# Patient Record
Sex: MALE | Race: OTHER | HISPANIC OR LATINO | URBAN - METROPOLITAN AREA
[De-identification: names, ages, dates, MRNs, and addresses within clinical notes are randomized per-mention and may not be internally consistent; named-entity substitution may affect disease eponyms.]

---

## 2022-04-18 ENCOUNTER — EMERGENCY (EMERGENCY)
Facility: HOSPITAL | Age: 27
LOS: 1 days | Discharge: HOME | End: 2022-04-18
Admitting: EMERGENCY MEDICINE
Payer: OTHER MISCELLANEOUS
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
## 2022-04-18 DIAGNOSIS — S61.213A LACERATION WITHOUT FOREIGN BODY OF LEFT MIDDLE FINGER WITHOUT DAMAGE TO NAIL, INITIAL ENCOUNTER: ICD-10-CM
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
## 2022-04-18 DIAGNOSIS — M79.642 PAIN IN LEFT HAND: ICD-10-CM

## 2022-04-18 PROCEDURE — 99283 EMERGENCY DEPT VISIT LOW MDM: CPT

## 2022-04-18 PROCEDURE — 73130 X-RAY EXAM OF HAND: CPT | Mod: 26,LT

## 2022-07-19 ENCOUNTER — INPATIENT (INPATIENT)
Facility: HOSPITAL | Age: 27
LOS: 8 days | Discharge: HOME | DRG: 988 | End: 2022-07-28
Attending: HOSPITALIST | Admitting: HOSPITALIST
Payer: COMMERCIAL

## 2022-07-19 ENCOUNTER — EMERGENCY (EMERGENCY)
Facility: HOSPITAL | Age: 27
LOS: 1 days | Discharge: HOME | End: 2022-07-19
Admitting: EMERGENCY MEDICINE

## 2022-07-19 ENCOUNTER — TRANSCRIPTION ENCOUNTER (OUTPATIENT)
Age: 27
End: 2022-07-19

## 2022-07-19 VITALS
HEART RATE: 88 BPM | SYSTOLIC BLOOD PRESSURE: 147 MMHG | TEMPERATURE: 98 F | DIASTOLIC BLOOD PRESSURE: 80 MMHG | RESPIRATION RATE: 16 BRPM | OXYGEN SATURATION: 99 %

## 2022-07-19 DIAGNOSIS — R22.32 LOCALIZED SWELLING, MASS AND LUMP, LEFT UPPER LIMB: ICD-10-CM

## 2022-07-19 DIAGNOSIS — L08.89 OTHER SPECIFIED LOCAL INFECTIONS OF THE SKIN AND SUBCUTANEOUS TISSUE: ICD-10-CM

## 2022-07-19 DIAGNOSIS — L03.114 CELLULITIS OF LEFT UPPER LIMB: ICD-10-CM

## 2022-07-19 LAB
ALBUMIN SERPL ELPH-MCNC: 4.2 G/DL — SIGNIFICANT CHANGE UP (ref 3.3–5.2)
ALP SERPL-CCNC: 93 U/L — SIGNIFICANT CHANGE UP (ref 40–120)
ALT FLD-CCNC: 19 U/L — SIGNIFICANT CHANGE UP
ANION GAP SERPL CALC-SCNC: 11 MMOL/L — SIGNIFICANT CHANGE UP (ref 5–17)
AST SERPL-CCNC: 20 U/L — SIGNIFICANT CHANGE UP
BASE EXCESS BLDV CALC-SCNC: 1.7 MMOL/L — SIGNIFICANT CHANGE UP (ref -2–3)
BASOPHILS # BLD AUTO: 0.15 K/UL — SIGNIFICANT CHANGE UP (ref 0–0.2)
BASOPHILS NFR BLD AUTO: 0.9 % — SIGNIFICANT CHANGE UP (ref 0–2)
BILIRUB SERPL-MCNC: 1.1 MG/DL — SIGNIFICANT CHANGE UP (ref 0.4–2)
BUN SERPL-MCNC: 7.4 MG/DL — LOW (ref 8–20)
CA-I SERPL-SCNC: 1.09 MMOL/L — LOW (ref 1.15–1.33)
CALCIUM SERPL-MCNC: 8.8 MG/DL — SIGNIFICANT CHANGE UP (ref 8.6–10.2)
CHLORIDE BLDV-SCNC: 101 MMOL/L — SIGNIFICANT CHANGE UP (ref 98–107)
CHLORIDE SERPL-SCNC: 101 MMOL/L — SIGNIFICANT CHANGE UP (ref 98–107)
CO2 SERPL-SCNC: 25 MMOL/L — SIGNIFICANT CHANGE UP (ref 22–29)
CREAT SERPL-MCNC: 0.86 MG/DL — SIGNIFICANT CHANGE UP (ref 0.5–1.3)
EGFR: 124 ML/MIN/1.73M2 — SIGNIFICANT CHANGE UP
EOSINOPHIL # BLD AUTO: 0.44 K/UL — SIGNIFICANT CHANGE UP (ref 0–0.5)
EOSINOPHIL NFR BLD AUTO: 2.6 % — SIGNIFICANT CHANGE UP (ref 0–6)
GAS PNL BLDV: 131 MMOL/L — LOW (ref 136–145)
GAS PNL BLDV: SIGNIFICANT CHANGE UP
GLUCOSE BLDV-MCNC: 110 MG/DL — HIGH (ref 70–99)
GLUCOSE SERPL-MCNC: 106 MG/DL — HIGH (ref 70–99)
HCO3 BLDV-SCNC: 28 MMOL/L — SIGNIFICANT CHANGE UP (ref 22–29)
HCT VFR BLD CALC: 45 % — SIGNIFICANT CHANGE UP (ref 39–50)
HCT VFR BLDA CALC: 47 % — SIGNIFICANT CHANGE UP
HGB BLD CALC-MCNC: 15.7 G/DL — SIGNIFICANT CHANGE UP (ref 12.6–17.4)
HGB BLD-MCNC: 15.5 G/DL — SIGNIFICANT CHANGE UP (ref 13–17)
LACTATE BLDV-MCNC: 1.6 MMOL/L — SIGNIFICANT CHANGE UP (ref 0.5–2)
LYMPHOCYTES # BLD AUTO: 1.91 K/UL — SIGNIFICANT CHANGE UP (ref 1–3.3)
LYMPHOCYTES # BLD AUTO: 11.4 % — LOW (ref 13–44)
MANUAL SMEAR VERIFICATION: SIGNIFICANT CHANGE UP
MCHC RBC-ENTMCNC: 31.2 PG — SIGNIFICANT CHANGE UP (ref 27–34)
MCHC RBC-ENTMCNC: 34.4 GM/DL — SIGNIFICANT CHANGE UP (ref 32–36)
MCV RBC AUTO: 90.5 FL — SIGNIFICANT CHANGE UP (ref 80–100)
MONOCYTES # BLD AUTO: 1.47 K/UL — HIGH (ref 0–0.9)
MONOCYTES NFR BLD AUTO: 8.8 % — SIGNIFICANT CHANGE UP (ref 2–14)
NEUTROPHILS # BLD AUTO: 12.63 K/UL — HIGH (ref 1.8–7.4)
NEUTROPHILS NFR BLD AUTO: 75.4 % — SIGNIFICANT CHANGE UP (ref 43–77)
PCO2 BLDV: 54 MMHG — SIGNIFICANT CHANGE UP (ref 42–55)
PH BLDV: 7.32 — SIGNIFICANT CHANGE UP (ref 7.32–7.43)
PLAT MORPH BLD: NORMAL — SIGNIFICANT CHANGE UP
PLATELET # BLD AUTO: 255 K/UL — SIGNIFICANT CHANGE UP (ref 150–400)
PO2 BLDV: 43 MMHG — SIGNIFICANT CHANGE UP (ref 25–45)
POTASSIUM BLDV-SCNC: 3.8 MMOL/L — SIGNIFICANT CHANGE UP (ref 3.5–5.1)
POTASSIUM SERPL-MCNC: 4 MMOL/L — SIGNIFICANT CHANGE UP (ref 3.5–5.3)
POTASSIUM SERPL-SCNC: 4 MMOL/L — SIGNIFICANT CHANGE UP (ref 3.5–5.3)
PROT SERPL-MCNC: 7.2 G/DL — SIGNIFICANT CHANGE UP (ref 6.6–8.7)
RBC # BLD: 4.97 M/UL — SIGNIFICANT CHANGE UP (ref 4.2–5.8)
RBC # FLD: 13.2 % — SIGNIFICANT CHANGE UP (ref 10.3–14.5)
RBC BLD AUTO: NORMAL — SIGNIFICANT CHANGE UP
SAO2 % BLDV: 65.2 % — SIGNIFICANT CHANGE UP
SODIUM SERPL-SCNC: 137 MMOL/L — SIGNIFICANT CHANGE UP (ref 135–145)
VARIANT LYMPHS # BLD: 0.9 % — SIGNIFICANT CHANGE UP (ref 0–6)
WBC # BLD: 16.75 K/UL — HIGH (ref 3.8–10.5)
WBC # FLD AUTO: 16.75 K/UL — HIGH (ref 3.8–10.5)

## 2022-07-19 PROCEDURE — 99285 EMERGENCY DEPT VISIT HI MDM: CPT

## 2022-07-19 PROCEDURE — 99223 1ST HOSP IP/OBS HIGH 75: CPT

## 2022-07-19 PROCEDURE — 73130 X-RAY EXAM OF HAND: CPT | Mod: 26,LT,77

## 2022-07-19 PROCEDURE — 73130 X-RAY EXAM OF HAND: CPT | Mod: 26,LT

## 2022-07-19 PROCEDURE — 73110 X-RAY EXAM OF WRIST: CPT | Mod: 26,LT

## 2022-07-19 RX ORDER — IBUPROFEN 200 MG
400 TABLET ORAL THREE TIMES A DAY
Refills: 0 | Status: DISCONTINUED | OUTPATIENT
Start: 2022-07-19 | End: 2022-07-28

## 2022-07-19 RX ORDER — MORPHINE SULFATE 50 MG/1
6 CAPSULE, EXTENDED RELEASE ORAL ONCE
Refills: 0 | Status: DISCONTINUED | OUTPATIENT
Start: 2022-07-19 | End: 2022-07-19

## 2022-07-19 RX ORDER — PIPERACILLIN AND TAZOBACTAM 4; .5 G/20ML; G/20ML
3.38 INJECTION, POWDER, LYOPHILIZED, FOR SOLUTION INTRAVENOUS EVERY 8 HOURS
Refills: 0 | Status: DISCONTINUED | OUTPATIENT
Start: 2022-07-19 | End: 2022-07-22

## 2022-07-19 RX ORDER — SODIUM CHLORIDE 9 MG/ML
1000 INJECTION INTRAMUSCULAR; INTRAVENOUS; SUBCUTANEOUS ONCE
Refills: 0 | Status: COMPLETED | OUTPATIENT
Start: 2022-07-19 | End: 2022-07-19

## 2022-07-19 RX ORDER — VANCOMYCIN HCL 1 G
1000 VIAL (EA) INTRAVENOUS ONCE
Refills: 0 | Status: COMPLETED | OUTPATIENT
Start: 2022-07-19 | End: 2022-07-19

## 2022-07-19 RX ORDER — VANCOMYCIN HCL 1 G
1000 VIAL (EA) INTRAVENOUS ONCE
Refills: 0 | Status: DISCONTINUED | OUTPATIENT
Start: 2022-07-19 | End: 2022-07-20

## 2022-07-19 RX ADMIN — PIPERACILLIN AND TAZOBACTAM 25 GRAM(S): 4; .5 INJECTION, POWDER, LYOPHILIZED, FOR SOLUTION INTRAVENOUS at 16:38

## 2022-07-19 RX ADMIN — SODIUM CHLORIDE 1000 MILLILITER(S): 9 INJECTION INTRAMUSCULAR; INTRAVENOUS; SUBCUTANEOUS at 13:33

## 2022-07-19 RX ADMIN — MORPHINE SULFATE 6 MILLIGRAM(S): 50 CAPSULE, EXTENDED RELEASE ORAL at 15:46

## 2022-07-19 RX ADMIN — MORPHINE SULFATE 6 MILLIGRAM(S): 50 CAPSULE, EXTENDED RELEASE ORAL at 13:34

## 2022-07-19 RX ADMIN — PIPERACILLIN AND TAZOBACTAM 25 GRAM(S): 4; .5 INJECTION, POWDER, LYOPHILIZED, FOR SOLUTION INTRAVENOUS at 22:35

## 2022-07-19 RX ADMIN — Medication 250 MILLIGRAM(S): at 13:34

## 2022-07-19 RX ADMIN — Medication 400 MILLIGRAM(S): at 19:35

## 2022-07-19 RX ADMIN — Medication 400 MILLIGRAM(S): at 18:34

## 2022-07-19 NOTE — CONSULT NOTE ADULT - NS ATTEND AMEND GEN_ALL_CORE FT
Pt with left thumb infection of unknown etiology beginning last Saturday.  He notes a burning sensation when putting on a glove at work.  He developed a black spot on the thumb that progressed to a severe deep space thenar infection.  Bedside I&D attempted in the ER.  Will continue to monitor with IV abx for worsening fo infection.

## 2022-07-19 NOTE — H&P ADULT - HISTORY OF PRESENT ILLNESS
26 year old male with no known significant medical history is sent from [facility] [place] for a hand tenosynovitis and possible deeper tissue abscess collection in the left hand. As per the patient he put his hand in a glove and felt something shrp and had about a one cm cut and over the past two days the area got red and swollen and painful and now he has the thumb swollen tender red hand . accompanied with fever wand nausea no chills. pain moderate intensity no relieving or exacerbating conditions   no cp no sob no LOC

## 2022-07-19 NOTE — H&P ADULT - NSHPPHYSICALEXAM_GEN_ALL_CORE
GENERAL: NAD,   HEAD:  Atraumatic, Normocephalic  EYES: EOMI, PERRL, conjunctiva and sclera clear  ENMT: No tonsillar erythema, exudates, or enlargement; Moist mucous membranes, Good dentition, No lesions  NECK: Supple, No JVD, Normal thyroid  NERVOUS SYSTEM:  Alert & Oriented X3, Motor Strength 5/5 B/L upper and lower extremities;   CHEST/LUNG: Clear to percussion bilaterally; No rales, rhonchi, wheezing, or rubs  HEART: Regular rate and rhythm; No murmurs, rubs, or gallops  ABDOMEN: Soft, Nontender, Nondistended; Bowel sounds present  EXTREMITIES: red swelling tender thumb and hand streaking red up the forearm 2+ Peripheral Pulses, No clubbing, cyanosis, or edema  LYMPH: No lymphadenopathy noted  SKIN: No rashes or lesions GENERAL: NAD,   HEAD:  Atraumatic, Normocephalic  EYES: EOMI, PERRL, conjunctiva and sclera clear  ENMT: No tonsillar erythema, exudates, or enlargement  NECK: Supple, Normal thyroid  NERVOUS SYSTEM:  Alert & Oriented X3, Motor Strength 5/5 B/L upper and lower extremities;   CHEST/LUNG: Clear to percussion bilaterally; No rales, rhonchi, wheezing, or rubs  HEART: Regular rate and rhythm; No murmurs, rubs, or gallops  ABDOMEN: Soft, Nontender, Nondistended; Bowel sounds present  EXTREMITIES: red swelling tender thumb and hand streaking red up the forearm 2+ Peripheral Pulses, No clubbing, cyanosis, or edema  LYMPH: No lymphadenopathy noted  SKIN: No rashes or lesions

## 2022-07-19 NOTE — ED ADULT TRIAGE NOTE - CHIEF COMPLAINT QUOTE
Pt arrives with extensive L hand swelling s/p sliding hand in glove 4 days ago and feeling a bite/sting of unknown nature to anterior thumb. Pt states this happened 4 days ago and pt noticed the swelling and pain onset only 2 days ago. there is an area of necrosis where pt was injured. Pt arrives with zosyn and NS infusing.

## 2022-07-19 NOTE — CONSULT NOTE ADULT - SUBJECTIVE AND OBJECTIVE BOX
ORTHO-HAND SERVICE    Pt Name: TRAY MCCLELLAN    MRN: [id number]      Patient is a 26y Male presenting to the emergency department with a chief complaint of left hand pain and redness.  patient went to place glove on his hand on Saturday when he felt a puncture.  patient states symptoms increased since.  patient went to [facility] [place] today and was transferred here.  patient with WBC of 17.        Patient presents for evaluation of       REVIEW OF SYSTEMS    General: Alert, responsive, in NAD    Skin/Breast: No rashes, no pruritis   	  Ophthalmologic: No visual changes. No redness.   	  ENMT:	No discharge. No swelling.    Respiratory and Thorax: No difficulty breathing. No cough.  	   Cardiovascular: No chest pain. No palpitations.    Gastrointestinal: No abdominal pain. No diarrhea.     Genitourinary: No dysuria. No bleeding.    Musculoskeletal: SEE HPI.    Neurological: No sensory or motor changes.     Psychiatric: No anxiety or depression.    Hematology/Lymphatics: No swelling.    Endocrine: No Hx of diabetes.    ROS is otherwise negative.    PAST MEDICAL & SURGICAL HISTORY:  PAST MEDICAL & SURGICAL HISTORY:      Allergies: No Known Allergies      Medications: morphine  - Injectable 6 milliGRAM(s) IV Push Once  sodium chloride 0.9% Bolus 1000 milliLiter(s) IV Bolus once  vancomycin  IVPB. 1000 milliGRAM(s) IV Intermittent once      FAMILY HISTORY:  : non-contributo      Daily     Daily            PHYSICAL EXAM:      Appearance: Alert, responsive, in no acute distress.    Neurological: Sensation is grossly intact to light touch. 5/5 motor function of all extremities. No focal deficits or weaknesses found.    Skin: no rash on visible skin. Skin is clean, dry and intact. No bleeding. No abrasions. No ulcerations.    Vascular: 2+ distal pulses. Cap refill < 2 sec. No signs of venous  insufficiency  or stasis. No extremity ulcerations. No cyanosis.    Musculoskeletal:         Left Upper Extremity:  positive open wound to castillo thumb proximal to IP joint   positive swelling to hand and thumb with decreased ROM.   no streaking   pulse intact         Imaging Studies: films from [facility] [place] negative, will order new    discussed with DR. riley     A/P:  Pt is a 26y Male found to have Left thumb abscess/cellulitis     PLAN:    ORTHO-HAND SERVICE    Pt Name: TRAY MCCLELLAN    MRN: [id number]      Patient is a 26y Male presenting to the emergency department with a chief complaint of left hand pain and redness.  patient went to place glove on his hand on Saturday when he felt a puncture.  patient states symptoms increased since.  [place] [facility] [place].  patient with WBC of 17.        Patient presents for evaluation of       REVIEW OF SYSTEMS    General: Alert, responsive, in NAD    Skin/Breast: No rashes, no pruritis   	  Ophthalmologic: No visual changes. No redness.   	  ENMT:	No discharge. No swelling.    Respiratory and Thorax: No difficulty breathing. No cough.  	   Cardiovascular: No chest pain. No palpitations.    Gastrointestinal: No abdominal pain. No diarrhea.     Genitourinary: No dysuria. No bleeding.    Musculoskeletal: SEE HPI.    Neurological: No sensory or motor changes.     Psychiatric: No anxiety or depression.    Hematology/Lymphatics: No swelling.    Endocrine: No Hx of diabetes.    ROS is otherwise negative.    PAST MEDICAL & SURGICAL HISTORY:  PAST MEDICAL & SURGICAL HISTORY:      Allergies: No Known Allergies      Medications: morphine  - Injectable 6 milliGRAM(s) IV Push Once  sodium chloride 0.9% Bolus 1000 milliLiter(s) IV Bolus once  vancomycin  IVPB. 1000 milliGRAM(s) IV Intermittent once      FAMILY HISTORY:  : non-contributo      Daily     Daily            PHYSICAL EXAM:      Appearance: Alert, responsive, in no acute distress.    Neurological: Sensation is grossly intact to light touch. 5/5 motor function of all extremities. No focal deficits or weaknesses found.    Skin: no rash on visible skin. Skin is clean, dry and intact. No bleeding. No abrasions. No ulcerations.    Vascular: 2+ distal pulses. Cap refill < 2 sec. No signs of venous  insufficiency  or stasis. No extremity ulcerations. No cyanosis.    Musculoskeletal:         Left Upper Extremity:  positive open wound to castillo thumb proximal to IP joint   positive swelling to hand and thumb with decreased ROM.   no streaking   pulse intact         Imaging Studies: [place] [facility] [place], will order new    discussed with DR. riley     Incision and Drainage  PROCEDURE NOTE: incision and drainage    Performed by: Mike Marion PA-C    Indication: abscess    Consent: the risks and benefits of incision and drainage discussed with patient, including the risk of bleeding, infection, and technical failure. The risks of not performing the procedure, failure to diagnose infection with resultant systemic infection, discussed with the patient. The alternatives of performing the procedure also discussed. Written consent was obtained following the discussion.    Universal Protocol: A time out was performed and the correct patient and site were verified.    The affected site was prepped and draped in proper sterile fashion. The overlying skin was anesthetized with 7 ml of 1% lidocaine.  A #11 blade was used to create an incision over the area of fluctuation of castillo proximal thumb. Approximately 2 milliliters of purulent fluid was obtained. The site was packed. A dry sterile dressing was applied to site. The fluid was then sent to the lab for appropriate studies. The site was bandaged and no complications were noted. The patient tolerated the procedure well.    Post-Procedure Diagnosis: Abscess  Complications: None  Specimens Removed: fluid only  Prosthetic devices/implants:  none      A/P:  Pt is a 26y Male found to have Left thumb abscess/cellulitis     PLAN:   ABX per med and I&D  Packing changes daily   will monitor

## 2022-07-19 NOTE — ED PROVIDER NOTE - CLINICAL SUMMARY MEDICAL DECISION MAKING FREE TEXT BOX
Patient is a 27 yo male with no PMHx presenting with L hand swelling for 4 days. Patient states he stuck his hand in his glove 4 days ago and felt a sharp sting/bite at which point he developed significant swelling of his hand  initially at the thumb; over the past 2 days patient has had increased streaking going up his L arm and had a fever with nausea yesterday; Patient went to [place] where patient got labs, cultures, and xrays without signs of fracture at which point he was found to have a WBC 17K and lactate of 2 and started on IVF and zosyn. Currently states his pain is at 8/10, sharp, with paresthesias along his L arm. VSS, lungs ctab, belly soft nontender, L hand with significant diffuse swelling, small open 1 cm wound to thumb, 2+ pulses, capillary refill intact, pain limited ROM at the wrist, full ROM at the elbow and shoulder. Ortho consulted, recommended repeat xrays and MRI; morphine given for pain. Vanc and NS ordered to cover for MRSA; labs from [facility] in [facility]. Patient up to date on tetanus. Patient currently stable, ready and agreeable to admission to medicine for further abx, and further ortho management.

## 2022-07-19 NOTE — ED PROVIDER NOTE - ATTENDING CONTRIBUTION TO CARE
I personally saw the patient with the resident, and completed the key components of the history and physical exam. I then discussed the management plan with the resident.    25 y/o M with no PMH presents as transfer from [facility] [place] for left hand swelling, erythema, pain with streaking up the left arm after sticking his hand in his work glove 4 days ago. Was started on IV Zosyn and had septic work up initiated at [facility] [place], transferred here for hand.    PE - NAD, left hand erythema, induration, small puncture-like wound to the palmar aspect of thumb at the interphalangeal joint, fusiform swelling of the 1st-3rd digits, fingers held in flexion pain with passive extension, unable to oppose thumb due to swelling and pain, warm, tender to palpation, streaking up to the elbow on the volar aspect of the arm, + tender lymphadenopathy around the medial epicondyle and the axilla on the left, 2+ radial pulse, sensation intact left upper extremity.    Will continue ABx, XR's, ortho consult, will admit to medicine.

## 2022-07-19 NOTE — ED PROVIDER NOTE - OBJECTIVE STATEMENT
Patient is a 27 yo male with no PMHx presenting with L hand swelling for 4 days. Patient states he stuck his hand in his glove 4 days ago and felt a sharp sting/bite at which point he developed significant swelling of his hand  initially at the thumb; over the past 2 days patient has had increased streaking going up his L arm and had a fever with nausea yesterday; Patient went to [place] where patient got labs, cultures, and xrays without signs of fracture at which point he was found to have a WBC 17K and lactate of 2 and started on IVF and zosyn. Currently states his pain is at 8/10, sharp, with paresthesias along his L arm. Denies any PMHx, surgeries, allergies. Denies chills, dizziness, lightheadedness, dysphagia, dysarthria, diplopia, photophobia, syncope, cough, congestion, SOB, CP, abdominal pain, neck pain, back pain, diarrhea, dysuria, hematuria, hematochezia, hematemesis, n/v, recent travel, sick contacts, leg swelling.

## 2022-07-19 NOTE — H&P ADULT - ASSESSMENT
26 year old male with no known significant medical history is [place] [facility] [place]     Cellulitis hand tenosynovitis and possible deep tissue abscess collection in the left hand- possible early sepsis- with leukocytosis and high lactate    MRI hand ordered   IV antibiotics started broad spectrum- Zosyn and Vanco- follow culture results    IVF for high lactate - follow trend

## 2022-07-19 NOTE — ED PROVIDER NOTE - PHYSICAL EXAMINATION
Constitutional: Well appearing, awake, alert, oriented to person, place, and time/situation and in no apparent distress  ENMT: Airway patent nasal mucosa clear. Mouth with normal mucosa. Throat has no vesicles no oropharyngeal exudates and uvula is midline. No blood in the oropharynx  EYES: clear bilaterally, pupils equal, round and reactive to light  Cardiac: Regular rate, regular rhythm. Heart sounds S1, S2. No murmurs, rubs or gallops. Good capillary refill, 2+ pulses, no peripheral edema  Respiratory: Lungs CTAB, no use of accessory muscles, no crackles, satting 99% on RA in no distress  Gastrointestinal: Abdomen nondistended, non-tender, no rebound guarding or peritoneal signs  Genitourinary: No CVA tenderness, pelvis nontender, bladder nondistended  Musculoskeletal: Spine appears normal, range of motion is not limited, no muscle or joint tenderness  Neurological: Alert and oriented, no focal deficits, no motor or sensory deficits. CN 2-12 intact, PERRLA, EOMI, No FND, moving all extremities equally, sensation intact, No dysmetria, no ataxia, negative heel-shin, 5/5 strength   Skin: L hand with significant diffuse swelling, small open 1 cm wound to thumb, 2+ pulses, capillary refill intact, pain limited ROM at the wrist, full ROM at the elbow and shoulder

## 2022-07-20 ENCOUNTER — TRANSCRIPTION ENCOUNTER (OUTPATIENT)
Age: 27
End: 2022-07-20

## 2022-07-20 PROCEDURE — 99233 SBSQ HOSP IP/OBS HIGH 50: CPT

## 2022-07-20 PROCEDURE — 26025 DRAINAGE OF PALM BURSA: CPT | Mod: 1L,AS,LT

## 2022-07-20 PROCEDURE — 99223 1ST HOSP IP/OBS HIGH 75: CPT

## 2022-07-20 RX ORDER — VANCOMYCIN HCL 1 G
1000 VIAL (EA) INTRAVENOUS EVERY 8 HOURS
Refills: 0 | Status: DISCONTINUED | OUTPATIENT
Start: 2022-07-20 | End: 2022-07-21

## 2022-07-20 RX ORDER — SODIUM CHLORIDE 9 MG/ML
1000 INJECTION, SOLUTION INTRAVENOUS
Refills: 0 | Status: DISCONTINUED | OUTPATIENT
Start: 2022-07-20 | End: 2022-07-22

## 2022-07-20 RX ORDER — FENTANYL CITRATE 50 UG/ML
25 INJECTION INTRAVENOUS
Refills: 0 | Status: DISCONTINUED | OUTPATIENT
Start: 2022-07-20 | End: 2022-07-26

## 2022-07-20 RX ORDER — HYDROMORPHONE HYDROCHLORIDE 2 MG/ML
0.5 INJECTION INTRAMUSCULAR; INTRAVENOUS; SUBCUTANEOUS
Refills: 0 | Status: DISCONTINUED | OUTPATIENT
Start: 2022-07-20 | End: 2022-07-26

## 2022-07-20 RX ORDER — ONDANSETRON 8 MG/1
4 TABLET, FILM COATED ORAL ONCE
Refills: 0 | Status: DISCONTINUED | OUTPATIENT
Start: 2022-07-20 | End: 2022-07-28

## 2022-07-20 RX ORDER — SODIUM CHLORIDE 9 MG/ML
1000 INJECTION INTRAMUSCULAR; INTRAVENOUS; SUBCUTANEOUS
Refills: 0 | Status: DISCONTINUED | OUTPATIENT
Start: 2022-07-20 | End: 2022-07-22

## 2022-07-20 RX ADMIN — PIPERACILLIN AND TAZOBACTAM 25 GRAM(S): 4; .5 INJECTION, POWDER, LYOPHILIZED, FOR SOLUTION INTRAVENOUS at 05:07

## 2022-07-20 RX ADMIN — Medication 100 MILLIGRAM(S): at 19:55

## 2022-07-20 RX ADMIN — Medication 400 MILLIGRAM(S): at 02:02

## 2022-07-20 RX ADMIN — PIPERACILLIN AND TAZOBACTAM 25 GRAM(S): 4; .5 INJECTION, POWDER, LYOPHILIZED, FOR SOLUTION INTRAVENOUS at 14:19

## 2022-07-20 RX ADMIN — Medication 400 MILLIGRAM(S): at 01:02

## 2022-07-20 RX ADMIN — HYDROMORPHONE HYDROCHLORIDE 0.5 MILLIGRAM(S): 2 INJECTION INTRAMUSCULAR; INTRAVENOUS; SUBCUTANEOUS at 23:52

## 2022-07-20 RX ADMIN — Medication 250 MILLIGRAM(S): at 17:36

## 2022-07-20 RX ADMIN — Medication 400 MILLIGRAM(S): at 08:51

## 2022-07-20 NOTE — BRIEF OPERATIVE NOTE - ANTIBIOTIC PROTOCOL
Discharge Instructions For Your Heart Catheterization/Stent with Radial/Wrist Site    Activity: For the next 24 hours  Follow these guidelines related to the sedation medicine that you've received.   · You must have someone drive you home.  · You may feel tired, unsteady, or not quite yourself for the remainder of the day due to the sedation medicine. Your body gets rid of the medicine usually in 24 hours.  · Do not drive or operate machinery or power tools.  · Do not drink alcohol or smoke.  · Do not make any important decisions or sign important papers.    Activity:   Follow these guidelines related to the puncture site in your wrist.  · Minimal use of the arm used for the procedure for the remainder of the day. If possible, elevate your arm on a pillow and don't bend your wrist.  · No lifting more than 5 pounds for 5 days with the arm used for the procedure.  · If you do heavy physical work on your job, follow your doctor's instructions about when you may return to work.  · Use ice pack for discomfort.    Procedure Site Care:  · Keep the dressing on your procedure site for the remainder of the day. The following day, you may remove the dressing and shower. Gently cleanse the procedure site with soap and water and a clean wash cloth and pat dry.   · Apply a new Band-aid on the puncture site for 1 day;  Do not apply lotions, powders, or creams.  · No soaking the wrist in water (i.e., bathtub, hot tub, dish water, pool of water) until the wound has completely healed.     Common side effects you may notice  · Soreness at puncture site.  · Slight bruising at the site for several days to several weeks.  · Lump the size of a pea or marble at the puncture site for a few weeks.    Diet/Fluids  · Resume diet as prior to admission.  · If you had a catheterization or stent, drink plenty of liquids to help flush the dye used for your procedure out of your body (unless you're on a fluid restriction ordered by your  physician).    Medications  · Take mild pain reliever such as Tylenol/Acetaminophen as needed for pain.    Call your doctor with these issues  · Bleeding from the procedure site. If significant bleeding occurs or there is a growing lump at your site, apply firm pressure at the site where your dressing is. Call 911 and keep pressure on the site.  · Numbness, tingling or color change in the arm used for puncture site.  · Increasing pain and firmness near the puncture site.  · A temperature greater than 101 degrees.  · Redness or drainage around site.    If you have any concerns following your Procedure/Surgery and cannot reach your physician's office, please contact [facility] at [phone] for assistance.        Care After Anesthesia or Sedation    After discharge  • Due to the medicine given, someone must drive you home. It is strongly recommended to have someone stay with you at home the day of discharge and the night after surgery.  • If you have infants or small children at home, please have someone help you for at least 24 hours after your surgery.  • Do not drive for at least 24 hours after surgery (or as told by your doctor).  • Rest for the remainder of the day. Go up and down stairs slowly.  • Do not smoke after surgery. Smoking can delay healing.  • Do not operate heavy or potential harmful equipment.  • Do not make legally binding decisions.  • Do not drink alcohol for 24 hours.    Diet  • Nausea may be expected for the first 24 to 48 hours. Start eating a bland diet (toast, gelatin, 7-up, hot cereal, crackers, sherbet, broth soup).  • Drink plenty of fluids (6 to 8 glasses of water a day).  • Resume your regular diet as able.  • Avoid greasy or spicy foods for 24 hours.    Urination  • The effects of anesthetics may cause some people to have trouble passing urine the day of surgery. Drink a lot of fluids to help prevent this.  • Try to urinate within 8 hours of surgery.  • If you are  unable to pass urine and feel like you need to, call your doctor or the hospital.    Pain control  • Some incisions are injected with a long acting local anesthetic; it will wear off in 4 to 6 hours. You can expect to have some pain at this time.  • Treat your pain with the prescribed pain medicine before it wears off. Do not wait until your pain becomes severe.  • Ask your nurse when you had your last pain medicine, so you know when you can take another one after you get home.    Call your doctor if you have:  • Nausea and vomiting that does not stop  • Fever over 101° F  • Pain not relieved by pain medication  • If you are unable to pass your urine or you have not passed urine in the last 8 hours.  • Unusual changes in behavior  • Dizziness  • Hives  If you are not able to reach your doctor, you may call the emergency department.    Wound Healing: What You Should Know    Do I have an infection?  Your body may show signs your wound is infected. If you see one or more of these signs, please call your health care provider:  • Redness and swelling - Increased redness and swelling around the wound (some redness is expected in the first 48 hours).  • Warmth - The area around the wound is warmer than the surrounding skin.  • Fever - Your temperature is above 101º F or stays above 100º F.  • Odor - A new or stronger, sweet or foul smell coming from the wound.  • Swelling - Swelling spreading to other areas.  • Drainage - Large amounts of drainage that involves blood, clear fluid or pus from the wound. Or, the drainage amount increases or changes color. (It is normal to have a small amount of drainage.)  • Pain - Increase in your pain or change in the location of the pain.  • Separation - Any place where the incision is  or opening.    How can I help prevent the spread of infection when I take care of my wound?  1. Use good handwashing techniques before and after contact with your wound. Here are the steps to  follow:  Handwashing with soap and water:  • Wet hands with warm water and apply soap.  • Rub well over all surfaces for at least 15 seconds.  • Rinse well under water.  • Dry hands with clean towels.  • Turn off faucet with clean towels to prevent reinfecting hands.  Handwashing with a waterless alcohol-based product or gel:  • Apply approximately a nickel sized amount of product to palm of hand.  • Rub hands together, covering all surfaces including nails, until product evaporates, about 10 to 15 seconds.  2. Use clean latex or vinyl gloves if cleaning or touching wound surface.  3. Keep nails short and remove nail polish. Long nails or polish can harbor bacteria.  4. Keep jewelry clean or remove during wound care.  5. Use hypoallergenic lotions with anti-bacterial agents on skin surrounding wound to maintain moisture and prevent irritation.    What else can I do to help my wound heal quickly and prevent other wounds?  • Stay active - Activity and exercise promote good circulation, which leads to wound healing.  • Avoid smoking - Smoking narrows your blood vessels, which decreases both the blood supply to your wound and the amount of oxygen in your blood. This will decrease your ability to heal and increase your chance of infection.  • Avoid alcohol - Alcohol decreases the ability of your body to fight infection.  • Avoid sitting with legs or ankles crossed - This can compress the blood vessels in your legs and decrease the blood supply to your wound.  • Eat a balanced diet - If you are unable to eat a balanced diet or required foods, you may benefit from a vitamin or mineral supplement.    Pain Management  Special Note: Be sure to follow any specific post-op instructions from your surgeon or nurse.   Once you’re home, you may have some pain, since even minor surgery causes swelling and breakdown of tissue. When it comes to effective pain management, the tips you learned in the hospital also work at home. To get  the best pain relief possible, remember these points:    Use your medication only as directed  · If your pain is not relieved or if it gets worse, call your doctor.  · If pain lessens, try taking your medication less often.  Remember that medications need time to work  · Most pain relievers taken by mouth need at least 20-30 minutes to take effect.  · Take pain medication at regular times as directed. Don’t wait until the pain gets bad to take it.  Time your medication  · Try to time your medication so that you take it before beginning an activity, such as dressing or sitting at the table for dinner.  · Taking your medication at night may help you get a good night’s rest.  Eat lots of fruit and vegetables  · Constipation is a common side effect with some pain medications. Eating fruit and vegetables can help.  · Drink lots of fluids.  · Avoid laxatives unless your surgeon has prescribed them for you.  Avoid drinking alcohol while taking pain medication  · Mixing alcohol and pain medication can cause dizziness and slow your respiratory system. It can even be fatal.  · Avoid driving or operating machinery while taking pain medication.     Followed protocol

## 2022-07-20 NOTE — PROGRESS NOTE ADULT - SUBJECTIVE AND OBJECTIVE BOX
TRAY MCCLELLAN  ----------------------------------------  The patient was seen at bedside. Patient with tenosynovitis. Reported pain in the hand as well as the forearm.    Vital Signs Last 24 Hrs  T(C): 37 (20 Jul 2022 09:47), Max: 37.9 (19 Jul 2022 18:04)  T(F): 98.6 (20 Jul 2022 09:47), Max: 100.2 (19 Jul 2022 18:04)  HR: 92 (20 Jul 2022 09:47) (75 - 92)  BP: 154/79 (20 Jul 2022 09:47) (104/73 - 154/90)  BP(mean): --  RR: 18 (20 Jul 2022 09:47) (16 - 18)  SpO2: 98% (20 Jul 2022 09:47) (93% - 99%)    Parameters below as of 20 Jul 2022 09:47  Patient On (Oxygen Delivery Method): room air    PHYSICAL EXAMINATION:  ----------------------------------------  General appearance: No acute distress, Awake, Alert  HEENT: Normocephalic, Atraumatic, Conjunctiva clear, EOMI  Neck: Supple, No JVD, No tenderness  Lungs: Breath sound equal bilaterally, No wheezes, No rales  Cardiovascular: S1S2, Regular rhythm  Abdomen: Soft, Nontender, Nondistended, No guarding/rebound, Positive bowel sounds  Extremities: No clubbing, No cyanosis, No calf tenderness, Left hand swelling with streaking up the forearm  Neuro: Strength equal bilaterally, No tremors  Psychiatric: Appropriate mood, Normal affect    LABORATORY STUDIES:  ----------------------------------------             15.5   16.75 )-----------( 255      ( 19 Jul 2022 16:10 )             45.0     07-19    137  |  101  |  7.4<L>  ----------------------------<  106<H>  4.0   |  25.0  |  0.86    Ca    8.8      19 Jul 2022 16:10    TPro  7.2  /  Alb  4.2  /  TBili  1.1  /  DBili  x   /  AST  20  /  ALT  19  /  AlkPhos  93  07-19    LIVER FUNCTIONS - ( 19 Jul 2022 16:10 )  Alb: 4.2 g/dL / Pro: 7.2 g/dL / ALK PHOS: 93 U/L / ALT: 19 U/L / AST: 20 U/L / GGT: x           MEDICATIONS  (STANDING):  clindamycin IVPB      piperacillin/tazobactam IVPB.. 3.375 Gram(s) IV Intermittent every 8 hours  vancomycin  IVPB 1000 milliGRAM(s) IV Intermittent every 8 hours    MEDICATIONS  (PRN):  ibuprofen  Tablet. 400 milliGRAM(s) Oral three times a day PRN Temp greater or equal to 38C (100.4F), Moderate Pain (4 - 6)      ASSESSMENT / PLAN:  ----------------------------------------  26M without significant medical history who presented to [facility] after injury to his hand with resulting swelling and pain. He was transferred to [facility] for further evaluation for concern of tenosynovitis.    Cellulitis - Possible tenosynovitis. Discussed with Hand Surgery. The patient underwent bedside incision and drainage but the pain and swelling have not improved and the patient is now noted to have streaking up the forearm. Planned for surgical intervention later today. Discussed with Infectious Disease, intravenous antibiotics were adjusted. Culture results to be reviewed when available. Analgesic medications as needed.

## 2022-07-20 NOTE — PROGRESS NOTE ADULT - SUBJECTIVE AND OBJECTIVE BOX
TRAY BLANKENSHIP    [id number]    History:  The patient is being followed for Left hand open wound to castillo thumb proximal to IP joint.  Patient works as a  and states when he placed his hand in a glove he felt sharp piercing pain at his thumb.  He was treated at [facility] and transferred to [facility].  Bedside I/D performed 1 day ago.  Patient reports no improvement with IV abx, pain and swelling has increased and now streaking up to axilla.    No acute sensory changes are reported.   Motion is limited at hand, wrist and elbow on exam today.   Moderate swelling noted to LUE, hand and wrist    Vital Signs Last 24 Hrs  T(C): 37 (20 Jul 2022 09:47), Max: 37.9 (19 Jul 2022 18:04)  T(F): 98.6 (20 Jul 2022 09:47), Max: 100.2 (19 Jul 2022 18:04)  HR: 92 (20 Jul 2022 09:47) (75 - 92)  BP: 154/79 (20 Jul 2022 09:47) (104/73 - 154/90)  BP(mean): --  RR: 18 (20 Jul 2022 09:47) (16 - 18)  SpO2: 98% (20 Jul 2022 09:47) (93% - 99%)    Parameters below as of 20 Jul 2022 09:47  Patient On (Oxygen Delivery Method): room air                              15.5   16.75 )-----------( 255      ( 19 Jul 2022 16:10 )             45.0     07-19    137  |  101  |  7.4<L>  ----------------------------<  106<H>  4.0   |  25.0  |  0.86    Ca    8.8      19 Jul 2022 16:10    TPro  7.2  /  Alb  4.2  /  TBili  1.1  /  DBili  x   /  AST  20  /  ALT  19  /  AlkPhos  93  07-19      MEDICATIONS  (STANDING):  piperacillin/tazobactam IVPB.. 3.375 Gram(s) IV Intermittent every 8 hours  vancomycin  IVPB 1000 milliGRAM(s) IV Intermittent once    MEDICATIONS  (PRN):  ibuprofen  Tablet. 400 milliGRAM(s) Oral three times a day PRN Temp greater or equal to 38C (100.4F), Moderate Pain (4 - 6)      Physical exam: There is erythema of the entire hand streaking to axilla.  This has worsened since yesterday. . There is moderate tenderness of the hand and wrist. No fluctuance. No drainage or discharge. proximal streaking is noted. Compartments of hand are tense, forearm soft and compressible. Sensation to light touch is grossly intact without focal deficit and is symmetric bilaterally. Motion is mildly limited as a result of pain. No focal motor weaknesses are appreciated. 2+ radial pulse. Capillary refill is less than 2 seconds. No cyanosis..  Packing was removed.   Primary Orthopedic Assessment:  • hand cellulitis/ bedside i/d i day ago without improvement     Secondary  Orthopedic Assessment(s):   •     Secondary  Medical Assessment(s):   •     Plan:   NPO FOR SURGERY TODAY order placed at 11:15am, patient received and ate lunch at noon.   • DVT prophylaxis as prescribed  • Continue IV antibiotics   • Strict Elevation of the affected extremity  • Pain control as clinically indicated  Discussed with Dr Aldana, patient will require surgical intervention today.   •

## 2022-07-20 NOTE — CONSULT NOTE ADULT - SUBJECTIVE AND OBJECTIVE BOX
[facility]                                                INFECTIOUS DISEASES  =======================================================                               Jose Acosta MD#  Delano Mancia MD*                                     Dieudonne Taylor MD*    Kate Singh MD*            Diplomates American Board of Internal Medicine & Infectious Diseases                  # [place] Office - Appt - Tel  [phone] Fax [phone]                * [place] Office - Appt - Tel [phone] Fax [phone]                                  Hospital Consult line:  [phone]  =======================================================      [id number]  TRAY VY   HPI:  26 year old male with no known significant medical history is sent from [facility] for a hand tenosynovitis and possible deeper tissue abscess collection in the left hand. As per the patient he put his hand in a glove and felt something shrp and had about a one cm cut and over the past two days the area got red and swollen and painful and now he has the thumb swollen tender red hand . accompanied with fever wand nausea no chills. pain moderate intensity no relieving or exacerbating conditions   no cp no sob no LOC  (19 Jul 2022 13:20)          I have personally reviewed the labs and data; pertinent labs and data are listed in this note; please see below.   =======================================================  Past Medical & Surgical Hx:  =====================  PAST MEDICAL & SURGICAL HISTORY:    Problem List:  ==========  HEALTH ISSUES - PROBLEM Dx:        Social Hx:  =======  no toxic habits currently    FAMILY HISTORY:  no significant family history of immunosuppressive disorders in mother or father   =======================================================    REVIEW OF SYSTEMS:  CONSTITUTIONAL:  No Fever or chills  HEENT:  No diplopia or blurred vision.  No earache, sore throat or runny nose.  CARDIOVASCULAR:  No pressure, squeezing, strangling, tightness, heaviness or aching about the chest, neck, axilla or epigastrium.  RESPIRATORY:  No cough, shortness of breath  GASTROINTESTINAL:  No nausea, vomiting or diarrhea.  GENITOURINARY:  No dysuria, frequency or urgency. No Blood in urine  MUSCULOSKELETAL:  no joint aches, no muscle pain  SKIN:  No change in skin, hair or nails.  NEUROLOGIC:  No Headaches, seizures or weakness.  PSYCHIATRIC:  No disorder of thought or mood.  ENDOCRINE:  No heat or cold intolerance  HEMATOLOGICAL:  No easy bruising or bleeding.    =======================================================  Allergies    No Known Allergies    Intolerances    Antibiotics:  piperacillin/tazobactam IVPB.. 3.375 Gram(s) IV Intermittent every 8 hours  vancomycin  IVPB 1000 milliGRAM(s) IV Intermittent once    Other medications:   piperacillin/tazobactam IVPB..   25 mL/Hr IV Intermittent (07-19-22 @ 16:38)   25 mL/Hr IV Intermittent (07-19-22 @ 22:35)   25 mL/Hr IV Intermittent (07-20-22 @ 05:07)   25 mL/Hr IV Intermittent (07-20-22 @ 14:19)    vancomycin  IVPB.   250 mL/Hr IV Intermittent (07-19-22 @ 13:34)      ======================================================  Physical Exam:  ============  T(F): 98.6 (20 Jul 2022 09:47), Max: 100.2 (19 Jul 2022 18:04)  HR: 92 (20 Jul 2022 09:47)  BP: 154/79 (20 Jul 2022 09:47)  RR: 18 (20 Jul 2022 09:47)  SpO2: 98% (20 Jul 2022 09:47) (93% - 99%)  temp max in last 48H T(F): , Max: 100.2 (07-19-22 @ 18:04)    General:  No acute distress.  Eye: Pupils are equal, round and reactive to light, Extraocular movements are intact, Normal conjunctiva.  HENT: Normocephalic, Oral mucosa is moist, No pharyngeal erythema, No sinus tenderness.  Neck: Supple, No lymphadenopathy.  Respiratory: Lungs are clear to auscultation, Respirations are non-labored.  Cardiovascular: Normal rate, Regular rhythm,   Gastrointestinal: Soft, Non-tender, Non-distended, Normal bowel sounds.  Genitourinary: No costovertebral angle tenderness.  Lymphatics: No lymphadenopathy neck,   Musculoskeletal: Normal range of motion, Normal strength.  Integumentary: No rash.  Neurologic: Alert, Oriented, No focal deficits, Cranial Nerves II-XII are grossly intact.  Psychiatric: Appropriate mood & affect.    =======================================================  Labs:                        15.5   16.75 )-----------( 255      ( 19 Jul 2022 16:10 )             45.0     07-19    137  |  101  |  7.4<L>  ----------------------------<  106<H>  4.0   |  25.0  |  0.86    Ca    8.8      19 Jul 2022 16:10    TPro  7.2  /  Alb  4.2  /  TBili  1.1  /  DBili  x   /  AST  20  /  ALT  19  /  AlkPhos  93  07-19                                                        [facility]                                                INFECTIOUS DISEASES  =======================================================                               Jose Acosta MD#  Delano Mancia MD*                                     Dieudonne Taylor MD*    Kate Singh MD*            Diplomates American Board of Internal Medicine & Infectious Diseases                  # [place] Office - Appt - Tel  [phone] Fax [phone]                * [place] Office - Appt - Tel [phone] Fax [phone]                                  Hospital Consult line:  [phone]  =======================================================      [id number]  TRAY VY   HPI:  26 year old male with no known significant medical history is [place] [facility] [place] a hand tenosynovitis and possible deeper tissue abscess collection in the left hand. As per the patient he put his hand in a glove and felt something shrp and had about a one cm cut and over the past two days the area got red and swollen and painful and now he has the thumb swollen tender red hand . accompanied with fever wand nausea no chills. pain moderate intensity no relieving or exacerbating conditions   no cp no sob no LOC  (19 Jul 2022 13:20)          I have personally reviewed the labs and data; pertinent labs and data are listed in this note; please see below.   =======================================================  Past Medical & Surgical Hx:  =====================  PAST MEDICAL & SURGICAL HISTORY:    Problem List:  ==========  HEALTH ISSUES - PROBLEM Dx:        Social Hx:  =======  no toxic habits currently    FAMILY HISTORY:  no significant family history of immunosuppressive disorders in mother or father   =======================================================    REVIEW OF SYSTEMS:  CONSTITUTIONAL:  No Fever or chills  HEENT:  No diplopia or blurred vision.  No earache, sore throat or runny nose.  CARDIOVASCULAR:  No pressure, squeezing, strangling, tightness, heaviness or aching about the chest, neck, axilla or epigastrium.  RESPIRATORY:  No cough, shortness of breath  GASTROINTESTINAL:  No nausea, vomiting or diarrhea.  GENITOURINARY:  No dysuria, frequency or urgency. No Blood in urine  MUSCULOSKELETAL:  no joint aches, no muscle pain  SKIN:  No change in skin, hair or nails.  NEUROLOGIC:  No Headaches, seizures or weakness.  PSYCHIATRIC:  No disorder of thought or mood.  ENDOCRINE:  No heat or cold intolerance  HEMATOLOGICAL:  No easy bruising or bleeding.    =======================================================  Allergies    No Known Allergies    Intolerances    Antibiotics:  piperacillin/tazobactam IVPB.. 3.375 Gram(s) IV Intermittent every 8 hours  vancomycin  IVPB 1000 milliGRAM(s) IV Intermittent once    Other medications:   piperacillin/tazobactam IVPB..   25 mL/Hr IV Intermittent (07-19-22 @ 16:38)   25 mL/Hr IV Intermittent (07-19-22 @ 22:35)   25 mL/Hr IV Intermittent (07-20-22 @ 05:07)   25 mL/Hr IV Intermittent (07-20-22 @ 14:19)    vancomycin  IVPB.   250 mL/Hr IV Intermittent (07-19-22 @ 13:34)      ======================================================  Physical Exam:  ============  T(F): 98.6 (20 Jul 2022 09:47), Max: 100.2 (19 Jul 2022 18:04)  HR: 92 (20 Jul 2022 09:47)  BP: 154/79 (20 Jul 2022 09:47)  RR: 18 (20 Jul 2022 09:47)  SpO2: 98% (20 Jul 2022 09:47) (93% - 99%)  temp max in last 48H T(F): , Max: 100.2 (07-19-22 @ 18:04)    General:  No acute distress.  Eye: Pupils are equal, round and reactive to light, Extraocular movements are intact, Normal conjunctiva.  HENT: Normocephalic, Oral mucosa is moist, No pharyngeal erythema, No sinus tenderness.  Neck: Supple, No lymphadenopathy.  Respiratory: Lungs are clear to auscultation, Respirations are non-labored.  Cardiovascular: Normal rate, Regular rhythm,   Gastrointestinal: Soft, Non-tender, Non-distended, Normal bowel sounds.  Genitourinary: No costovertebral angle tenderness.  Lymphatics: No lymphadenopathy neck,   Musculoskeletal: Normal range of motion, Normal strength. left forearm streaking  erythema, left hand ++erythema swelling tender and tense compartments, limited ROM, thumb with dusky discoloration, I+D site on flexor surface of thumb with packing no drainage  Integumentary: No rash.  Neurologic: Alert, Oriented, No focal deficits, Cranial Nerves II-XII are grossly intact.  Psychiatric: Appropriate mood & affect.    =======================================================  Labs:                        15.5   16.75 )-----------( 255      ( 19 Jul 2022 16:10 )             45.0     07-19    137  |  101  |  7.4<L>  ----------------------------<  106<H>  4.0   |  25.0  |  0.86    Ca    8.8      19 Jul 2022 16:10    TPro  7.2  /  Alb  4.2  /  TBili  1.1  /  DBili  x   /  AST  20  /  ALT  19  /  AlkPhos  93  07-19    < from: Xray Wrist 3 Views, Left (07.19.22 @ 14:03) >  FINDINGS: There is no evidence for acute fracture, dislocation, joint   space narrowing, soft tissue swelling or retained radiodense foreign body.    The radiocarpal joint space appears intact. Proximal carpal row   articulation appears unremarkable. No significant degenerative or   inflammatory arthropathy identified.    IMPRESSION: No evidence for acute fracture or dislocation. No significant   degenerative or inflammatory arthropathy identified.    --- End of Report ---    < end of copied text >    < from: Xray Hand 3 Views, Left (07.19.22 @ 14:03) >  FINDINGS: There is no evidence for acute fracture, dislocation, joint   space narrowing or retained radiodense foreign body. Soft tissue swelling   of the left hand identified.    IMPRESSION: No evidence for acute fracture or dislocation. Soft tissue   swelling.    --- End of Report ---    < end of copied text >

## 2022-07-20 NOTE — PROGRESS NOTE ADULT - SUBJECTIVE AND OBJECTIVE BOX
Pt c/o worsening pain in left thumb and palm.  No improvement overnight with IV abx  Significant left thumb edema and thenar pain.  No tendernness over midpalm, hypothenar space, or distal forearm.    Plan for I&D of left thumb, thenar space, midpalmar space, forearm, carpal tunnel.   R/B/A explained with   DIscussed bleeding, persistent infection, stiffness, loss of motion, need for additional surgery and prolonged IV abx  He understands and agrees to proceed.

## 2022-07-20 NOTE — CONSULT NOTE ADULT - ASSESSMENT
26 year old male with no known significant medical history is sent [place] [facility] [place] a hand tenosynovitis and possible deeper tissue abscess collection in the left hand. As per the patient he put his hand in a glove and felt something sharp and had about a one cm cut and over the past two days the area got red and swollen and painful and now he has the thumb swollen tender red hand. Accompanied with fever and nausea no chills. s/p bedside I+D but developed worsening erythema up forearm.    Left hand cellulitis/abscess/tenosynovitis  Leukocytosis    - f/u BCX and wound CX  - agree with plan for OR-please repeat cultures  - Continue empiric zosyn and vancomycin  - f/u vanco trough and adjust per pharmacy  - add clindamycin  - tetanus booster if not up to date  - Trend Fever  - Trend Leukocytosis    d/w Dr Avelar, pharmacy  Will Follow

## 2022-07-21 LAB
-  AMPICILLIN/SULBACTAM: SIGNIFICANT CHANGE UP
-  CEFAZOLIN: SIGNIFICANT CHANGE UP
-  CLINDAMYCIN: SIGNIFICANT CHANGE UP
-  ERYTHROMYCIN: SIGNIFICANT CHANGE UP
-  GENTAMICIN: SIGNIFICANT CHANGE UP
-  OXACILLIN: SIGNIFICANT CHANGE UP
-  PENICILLIN: SIGNIFICANT CHANGE UP
-  RIFAMPIN: SIGNIFICANT CHANGE UP
-  TETRACYCLINE: SIGNIFICANT CHANGE UP
-  TRIMETHOPRIM/SULFAMETHOXAZOLE: SIGNIFICANT CHANGE UP
-  VANCOMYCIN: SIGNIFICANT CHANGE UP
ANION GAP SERPL CALC-SCNC: 13 MMOL/L — SIGNIFICANT CHANGE UP (ref 5–17)
BUN SERPL-MCNC: 6.9 MG/DL — LOW (ref 8–20)
CALCIUM SERPL-MCNC: 9 MG/DL — SIGNIFICANT CHANGE UP (ref 8.6–10.2)
CHLORIDE SERPL-SCNC: 100 MMOL/L — SIGNIFICANT CHANGE UP (ref 98–107)
CO2 SERPL-SCNC: 25 MMOL/L — SIGNIFICANT CHANGE UP (ref 22–29)
CREAT SERPL-MCNC: 0.71 MG/DL — SIGNIFICANT CHANGE UP (ref 0.5–1.3)
EGFR: 131 ML/MIN/1.73M2 — SIGNIFICANT CHANGE UP
GLUCOSE SERPL-MCNC: 109 MG/DL — HIGH (ref 70–99)
HCT VFR BLD CALC: 45 % — SIGNIFICANT CHANGE UP (ref 39–50)
HGB BLD-MCNC: 14.9 G/DL — SIGNIFICANT CHANGE UP (ref 13–17)
MCHC RBC-ENTMCNC: 29.9 PG — SIGNIFICANT CHANGE UP (ref 27–34)
MCHC RBC-ENTMCNC: 33.1 GM/DL — SIGNIFICANT CHANGE UP (ref 32–36)
MCV RBC AUTO: 90.4 FL — SIGNIFICANT CHANGE UP (ref 80–100)
METHOD TYPE: SIGNIFICANT CHANGE UP
PLATELET # BLD AUTO: 272 K/UL — SIGNIFICANT CHANGE UP (ref 150–400)
POTASSIUM SERPL-MCNC: 3.6 MMOL/L — SIGNIFICANT CHANGE UP (ref 3.5–5.3)
POTASSIUM SERPL-SCNC: 3.6 MMOL/L — SIGNIFICANT CHANGE UP (ref 3.5–5.3)
RBC # BLD: 4.98 M/UL — SIGNIFICANT CHANGE UP (ref 4.2–5.8)
RBC # FLD: 12.9 % — SIGNIFICANT CHANGE UP (ref 10.3–14.5)
SODIUM SERPL-SCNC: 138 MMOL/L — SIGNIFICANT CHANGE UP (ref 135–145)
WBC # BLD: 15.95 K/UL — HIGH (ref 3.8–10.5)
WBC # FLD AUTO: 15.95 K/UL — HIGH (ref 3.8–10.5)

## 2022-07-21 PROCEDURE — 99233 SBSQ HOSP IP/OBS HIGH 50: CPT

## 2022-07-21 PROCEDURE — 99232 SBSQ HOSP IP/OBS MODERATE 35: CPT

## 2022-07-21 RX ORDER — OXYCODONE HYDROCHLORIDE 5 MG/1
10 TABLET ORAL
Refills: 0 | Status: DISCONTINUED | OUTPATIENT
Start: 2022-07-21 | End: 2022-07-27

## 2022-07-21 RX ORDER — OXYCODONE HYDROCHLORIDE 5 MG/1
5 TABLET ORAL
Refills: 0 | Status: DISCONTINUED | OUTPATIENT
Start: 2022-07-21 | End: 2022-07-21

## 2022-07-21 RX ADMIN — HYDROMORPHONE HYDROCHLORIDE 0.5 MILLIGRAM(S): 2 INJECTION INTRAMUSCULAR; INTRAVENOUS; SUBCUTANEOUS at 00:13

## 2022-07-21 RX ADMIN — OXYCODONE HYDROCHLORIDE 10 MILLIGRAM(S): 5 TABLET ORAL at 15:22

## 2022-07-21 RX ADMIN — OXYCODONE HYDROCHLORIDE 10 MILLIGRAM(S): 5 TABLET ORAL at 10:40

## 2022-07-21 RX ADMIN — PIPERACILLIN AND TAZOBACTAM 25 GRAM(S): 4; .5 INJECTION, POWDER, LYOPHILIZED, FOR SOLUTION INTRAVENOUS at 12:04

## 2022-07-21 RX ADMIN — Medication 100 MILLIGRAM(S): at 12:04

## 2022-07-21 RX ADMIN — SODIUM CHLORIDE 75 MILLILITER(S): 9 INJECTION, SOLUTION INTRAVENOUS at 23:05

## 2022-07-21 RX ADMIN — Medication 250 MILLIGRAM(S): at 15:22

## 2022-07-21 RX ADMIN — OXYCODONE HYDROCHLORIDE 10 MILLIGRAM(S): 5 TABLET ORAL at 01:54

## 2022-07-21 RX ADMIN — Medication 100 MILLIGRAM(S): at 21:30

## 2022-07-21 RX ADMIN — Medication 100 MILLIGRAM(S): at 06:19

## 2022-07-21 RX ADMIN — OXYCODONE HYDROCHLORIDE 10 MILLIGRAM(S): 5 TABLET ORAL at 02:54

## 2022-07-21 RX ADMIN — PIPERACILLIN AND TAZOBACTAM 25 GRAM(S): 4; .5 INJECTION, POWDER, LYOPHILIZED, FOR SOLUTION INTRAVENOUS at 01:53

## 2022-07-21 RX ADMIN — OXYCODONE HYDROCHLORIDE 10 MILLIGRAM(S): 5 TABLET ORAL at 23:40

## 2022-07-21 RX ADMIN — Medication 250 MILLIGRAM(S): at 06:19

## 2022-07-21 RX ADMIN — OXYCODONE HYDROCHLORIDE 10 MILLIGRAM(S): 5 TABLET ORAL at 11:00

## 2022-07-21 RX ADMIN — Medication 250 MILLIGRAM(S): at 00:32

## 2022-07-21 RX ADMIN — OXYCODONE HYDROCHLORIDE 10 MILLIGRAM(S): 5 TABLET ORAL at 16:15

## 2022-07-21 RX ADMIN — Medication 100 MILLIGRAM(S): at 00:11

## 2022-07-21 RX ADMIN — Medication 400 MILLIGRAM(S): at 00:36

## 2022-07-21 RX ADMIN — PIPERACILLIN AND TAZOBACTAM 25 GRAM(S): 4; .5 INJECTION, POWDER, LYOPHILIZED, FOR SOLUTION INTRAVENOUS at 19:57

## 2022-07-21 NOTE — PROGRESS NOTE ADULT - SUBJECTIVE AND OBJECTIVE BOX
TRAY MCCLELLAN  ----------------------------------------  The patient was seen at bedside. Patient with cellulitis/abscess. Reported left hand pain.    Vital Signs Last 24 Hrs  T(C): 36.9 (21 Jul 2022 11:40), Max: 37.6 (21 Jul 2022 01:10)  T(F): 98.4 (21 Jul 2022 11:40), Max: 99.7 (21 Jul 2022 01:10)  HR: 82 (21 Jul 2022 11:40) (82 - 90)  BP: 136/81 (21 Jul 2022 11:40) (117/69 - 167/96)  BP(mean): 100 (21 Jul 2022 00:40) (93 - 113)  RR: 18 (21 Jul 2022 11:40) (14 - 20)  SpO2: 98% (21 Jul 2022 11:40) (96% - 100%)    Parameters below as of 21 Jul 2022 11:40  Patient On (Oxygen Delivery Method): room air    PHYSICAL EXAMINATION:  ----------------------------------------  General appearance: No acute distress, Awake, Alert  HEENT: Normocephalic, Atraumatic, Conjunctiva clear, EOMI  Neck: Supple, No JVD, No tenderness  Lungs: Breath sound equal bilaterally, No wheezes, No rales  Cardiovascular: S1S2, Regular rhythm  Abdomen: Soft, Nontender, Nondistended, No guarding/rebound, Positive bowel sounds  Extremities: No clubbing, No cyanosis, No calf tenderness, Left hand swelling with dressing in place  Neuro: Strength equal bilaterally, No tremors  Psychiatric: Appropriate mood, Normal affect    LABORATORY STUDIES:  ----------------------------------------             14.9   15.95 )-----------( 272      ( 21 Jul 2022 06:30 )             45.0     07-21    138  |  100  |  6.9<L>  ----------------------------<  109<H>  3.6   |  25.0  |  0.71    Ca    9.0      21 Jul 2022 06:30    TPro  7.2  /  Alb  4.2  /  TBili  1.1  /  DBili  x   /  AST  20  /  ALT  19  /  AlkPhos  93  07-19    LIVER FUNCTIONS - ( 19 Jul 2022 16:10 )  Alb: 4.2 g/dL / Pro: 7.2 g/dL / ALK PHOS: 93 U/L / ALT: 19 U/L / AST: 20 U/L / GGT: x           Culture - Blood (collected 19 Jul 2022 14:40)  Source: .Blood Blood  Preliminary Report (20 Jul 2022 19:02):    No growth to date.    Culture - Abscess with Gram Stain (collected 19 Jul 2022 13:00)  Source: .Abscess Arm - Left  Preliminary Report (20 Jul 2022 16:34):    Numerous Staphylococcus aureus    MEDICATIONS  (STANDING):  clindamycin IVPB      clindamycin IVPB 600 milliGRAM(s) IV Intermittent every 8 hours  lactated ringers. 1000 milliLiter(s) (75 mL/Hr) IV Continuous <Continuous>  piperacillin/tazobactam IVPB.. 3.375 Gram(s) IV Intermittent every 8 hours  sodium chloride 0.9%. 1000 milliLiter(s) (125 mL/Hr) IV Continuous <Continuous>  vancomycin  IVPB 1000 milliGRAM(s) IV Intermittent every 8 hours    MEDICATIONS  (PRN):  fentaNYL    Injectable 25 MICROGram(s) IV Push every 5 minutes PRN Moderate Pain (4 - 6)  HYDROmorphone  Injectable 0.5 milliGRAM(s) IV Push every 10 minutes PRN Severe Pain (7 - 10)  ibuprofen  Tablet. 400 milliGRAM(s) Oral three times a day PRN Temp greater or equal to 38C (100.4F), Moderate Pain (4 - 6)  ondansetron Injectable 4 milliGRAM(s) IV Push once PRN Nausea and/or Vomiting  oxyCODONE    IR 5 milliGRAM(s) Oral every 3 hours PRN Mild Pain (1 - 3)  oxyCODONE    IR 10 milliGRAM(s) Oral every 3 hours PRN Moderate Pain (4 - 6)      ASSESSMENT / PLAN:  ----------------------------------------  26M without significant medical history who presented to [facility] after injury to his hand with resulting swelling and pain. He was transferred to [facility] for further evaluation for concern of tenosynovitis.    Cellulitis / Abscess - The patient initially underwent bedside incision and drainage but the pain and swelling worsened and the patient underwent incision and drainage in the operating room. Wound culture noted Staph aureus. On piperacillin/tazobactam and vancomycin. Pending MRI of the hand. Analgesic medications as needed.

## 2022-07-21 NOTE — PROGRESS NOTE ADULT - ASSESSMENT
26 year old male with no known significant medical history [place] [facility] [place] tenos[place]itis and possible deeper tissue abscess collection in the left hand. As per the patient he put his hand in a glove and felt something sharp and had about a one cm cut and over the past two days the area got red and swollen and painful and now he has the thumb swollen tender red hand. Accompanied with fever and nausea no chills. s/p bedside I+D but developed worsening erythema up forearm.    Left hand cellulitis/abscess/tenosynovitis s/p I+D left hand carpal tunnel release  Leukocytosis    - f/u BCX  - f/u wound CX staph aureus  - s/p I+D left hand carpal tunnel release  - f/u Or cx  - Continue empiric zosyn   - dc vanco  - c/w clinda  - pt reports getting tetanus vaccine 3-4 months ago  - ortho f/u  - Trend Fever  - Trend Leukocytosis      Will Follow

## 2022-07-21 NOTE — PROGRESS NOTE ADULT - SUBJECTIVE AND OBJECTIVE BOX
Pt Name: TRAY MCCLELLAN  MRN: [id number]    Late chart note. Patient seen around 730am.    Patient is a 26y male s/p ID left hand w/ carpal tunnel release POD #1. Patient is Estonian speaking. Patient seen and examined at bedside. Patient doing well and pain controlled. patient keeping LUE elevated. Patient states to have numbness to 1st digit. Patient states that he has trouble moving thumb secondary to swelling. Patient denies acute motor/sensory changes.       PAST MEDICAL & SURGICAL HISTORY:      Allergies: No Known Allergies                            14.9   15.95 )-----------( 272      ( 21 Jul 2022 06:30 )             45.0     07-21    138  |  100  |  6.9<L>  ----------------------------<  109<H>  3.6   |  25.0  |  0.71    Ca    9.0      21 Jul 2022 06:30    TPro  7.2  /  Alb  4.2  /  TBili  1.1  /  DBili  x   /  AST  20  /  ALT  19  /  AlkPhos  93  07-19      PHYSICAL EXAM:    Vital Signs Last 24 Hrs  T(C): 36.9 (21 Jul 2022 04:52), Max: 37.6 (21 Jul 2022 01:10)  T(F): 98.5 (21 Jul 2022 04:52), Max: 99.7 (21 Jul 2022 01:10)  HR: 85 (21 Jul 2022 04:52) (83 - 90)  BP: 117/69 (21 Jul 2022 04:52) (117/69 - 167/96)  BP(mean): 100 (21 Jul 2022 00:40) (93 - 113)  RR: 18 (21 Jul 2022 04:52) (14 - 20)  SpO2: 96% (21 Jul 2022 04:52) (96% - 100%)    Parameters below as of 21 Jul 2022 01:10  Patient On (Oxygen Delivery Method): room air      Daily     Daily     Appearance: Alert, responsive, in no acute distress.  Musculoskeletal:       Left Upper Extremity: + ACE wrap clean, dry, intact with mild staining noted. + Penrose noted to left hand. + ROM of digits - mild ROM noted to 1st digit. + Decreased sensation noted to 1st digit. Sensation grossly intact to light touch to rest of digits. Brisk capillary refill.         A/P:  Pt is a  26y Male s/p I&D left hand, left carpal tunnel release    PLAN:   - Packing to be removed later in afternoon  - Elevate LUE   - Continue penrose drains - to be removed on 7/22  - Pain control   - F/u OR cx   - Continue care per primary team Pt Name: TRAY MCCLLELAN  MRN: [id number]    Late chart note. Patient seen around 730am.    Patient is a 26y male s/p ID left hand w/ carpal tunnel release POD #1. Patient is Welsh speaking. Patient seen and examined at bedside. Patient doing well and pain controlled. patient keeping LUE elevated. Patient states to have numbness to 1st digit. Patient states that he has trouble moving thumb secondary to swelling. Patient denies acute motor/sensory changes.       PAST MEDICAL & SURGICAL HISTORY:      Allergies: No Known Allergies                            14.9   15.95 )-----------( 272      ( 21 Jul 2022 06:30 )             45.0     07-21    138  |  100  |  6.9<L>  ----------------------------<  109<H>  3.6   |  25.0  |  0.71    Ca    9.0      21 Jul 2022 06:30    TPro  7.2  /  Alb  4.2  /  TBili  1.1  /  DBili  x   /  AST  20  /  ALT  19  /  AlkPhos  93  07-19      PHYSICAL EXAM:    Vital Signs Last 24 Hrs  T(C): 36.9 (21 Jul 2022 04:52), Max: 37.6 (21 Jul 2022 01:10)  T(F): 98.5 (21 Jul 2022 04:52), Max: 99.7 (21 Jul 2022 01:10)  HR: 85 (21 Jul 2022 04:52) (83 - 90)  BP: 117/69 (21 Jul 2022 04:52) (117/69 - 167/96)  BP(mean): 100 (21 Jul 2022 00:40) (93 - 113)  RR: 18 (21 Jul 2022 04:52) (14 - 20)  SpO2: 96% (21 Jul 2022 04:52) (96% - 100%)    Parameters below as of 21 Jul 2022 01:10  Patient On (Oxygen Delivery Method): room air      Daily     Daily     Appearance: Alert, responsive, in no acute distress.  Musculoskeletal:       Left Upper Extremity: + ACE wrap clean, dry, intact with mild staining noted. + Penrose noted to left hand. + ROM of digits - mild ROM noted to 1st digit. + Decreased sensation noted to 1st digit. Sensation grossly intact to light touch to rest of digits. Brisk capillary refill.         A/P:  Pt is a  26y Male s/p I&D left hand, left carpal tunnel release    PLAN:   - Packing to be removed later in afternoon  - Elevate LUE   - Continue penrose drains - to be removed on 7/22  - Pain control   - F/u OR cx   - Continue care per primary team    ADDENDUM 7/21 @ 1:40PM:   Ace bandage and guaze removed. Bloody staining noted on abd pads. + moderate swelling noted to left hand. 2+ radial pulse. BCR. No erythema noted. + incision sites noted to volar aspect of wrist and volar aspect of palm of hand. Incision sites clean, dry, intact with no wound dehiscence noted. 2 wounds noted to dorsal and volar aspects of hand - 2 pen cayden drains noted at wounds, Packing removed. Soaks performed for adequate amount of time. New Dressings placed.      Pt Name: TRAY MCCLELLAN  MRN: [id number]    Late chart note. Patient seen around 730am.    Patient is a 26y male s/p ID left hand w/ carpal tunnel release POD #1. Patient is Somali speaking. Patient seen and examined at bedside. Patient doing well and pain controlled. patient keeping LUE elevated. Patient states to have numbness to 1st digit. Patient states that he has trouble moving thumb secondary to swelling. Patient denies acute motor/sensory changes.       PAST MEDICAL & SURGICAL HISTORY:      Allergies: No Known Allergies                            14.9   15.95 )-----------( 272      ( 21 Jul 2022 06:30 )             45.0     07-21    138  |  100  |  6.9<L>  ----------------------------<  109<H>  3.6   |  25.0  |  0.71    Ca    9.0      21 Jul 2022 06:30    TPro  7.2  /  Alb  4.2  /  TBili  1.1  /  DBili  x   /  AST  20  /  ALT  19  /  AlkPhos  93  07-19      PHYSICAL EXAM:    Vital Signs Last 24 Hrs  T(C): 36.9 (21 Jul 2022 04:52), Max: 37.6 (21 Jul 2022 01:10)  T(F): 98.5 (21 Jul 2022 04:52), Max: 99.7 (21 Jul 2022 01:10)  HR: 85 (21 Jul 2022 04:52) (83 - 90)  BP: 117/69 (21 Jul 2022 04:52) (117/69 - 167/96)  BP(mean): 100 (21 Jul 2022 00:40) (93 - 113)  RR: 18 (21 Jul 2022 04:52) (14 - 20)  SpO2: 96% (21 Jul 2022 04:52) (96% - 100%)    Parameters below as of 21 Jul 2022 01:10  Patient On (Oxygen Delivery Method): room air      Daily     Daily     Appearance: Alert, responsive, in no acute distress.  Musculoskeletal:       Left Upper Extremity: + ACE wrap clean, dry, intact with mild staining noted. + Penrose noted to left hand. + ROM of digits - mild ROM noted to 1st digit. + Decreased sensation noted to 1st digit. Sensation grossly intact to light touch to rest of digits. Brisk capillary refill.         A/P:  Pt is a  26y Male s/p I&D left hand, left carpal tunnel release    PLAN:   - Packing to be removed later in afternoon  - Elevate LUE   - Continue penrose drains - to be removed on 7/23  - Pain control   - F/u OR cx   - Continue care per primary team    ADDENDUM 7/21 @ 1:40PM:   Ace bandage and guaze removed. Bloody staining noted on abd pads. + moderate swelling noted to left hand. 2+ radial pulse. BCR. No erythema noted. + incision sites noted to volar aspect of wrist and volar aspect of palm of hand. Incision sites clean, dry, intact with no wound dehiscence noted. 2 wounds noted to dorsal and volar aspects of hand - 2 pen cayden drains noted at wounds, Packing removed. Soaks performed for adequate amount of time. New Dressings placed.

## 2022-07-21 NOTE — PROGRESS NOTE ADULT - SUBJECTIVE AND OBJECTIVE BOX
[facility]                                                INFECTIOUS DISEASES  =======================================================                               Jose Acosta MD#  Delano Mancia MD*                                     Dieudonne Taylor MD*    Kate Singh MD*            Diplomates American Board of Internal Medicine & Infectious Diseases                  # [place] Office - Appt - Tel  [phone] Fax [phone]                * [place] Office - Appt - Tel [phone] Fax [phone]                                  Hospital Consult line:  [phone]  =======================================================      [id number]  TRAY MCCLELLAN   follow up for: left hand tenosynovitis   s/p I+D left hand carpal tunnel release  patient seen and examined. s/p pain at left hand     id [id number]    I have personally reviewed the labs and data; pertinent labs and data are listed in this note; please see below.   ===================================================  REVIEW OF SYSTEMS:  CONSTITUTIONAL:  No Fever or chills  HEENT:  No diplopia or blurred vision.  No earache, sore throat or runny nose.  CARDIOVASCULAR:  No pressure, squeezing, strangling, tightness, heaviness or aching about the chest, neck, axilla or epigastrium.  RESPIRATORY:  No cough, shortness of breath  GASTROINTESTINAL:  No nausea, vomiting or diarrhea.  GENITOURINARY:  No dysuria, frequency or urgency. No Blood in urine  MUSCULOSKELETAL:  no joint aches, no muscle pain  SKIN:  No change in skin, hair or nails.  NEUROLOGIC:  No Headaches, seizures or weakness.  PSYCHIATRIC:  No disorder of thought or mood.  ENDOCRINE:  No heat or cold intolerance  HEMATOLOGICAL:  No easy bruising or bleeding.    =======================================================  Allergies    No Known Allergies    Intolerances    Antibiotics:  clindamycin IVPB      clindamycin IVPB 600 milliGRAM(s) IV Intermittent every 8 hours  piperacillin/tazobactam IVPB.. 3.375 Gram(s) IV Intermittent every 8 hours  vancomycin  IVPB 1000 milliGRAM(s) IV Intermittent every 8 hours    Other medications:  lactated ringers. 1000 milliLiter(s) IV Continuous <Continuous>  sodium chloride 0.9%. 1000 milliLiter(s) IV Continuous <Continuous>    ======================================================  Physical Exam:  ============  T(F): 99.8 (21 Jul 2022 16:50), Max: 99.8 (21 Jul 2022 16:50)  HR: 79 (21 Jul 2022 16:50)  BP: 138/80 (21 Jul 2022 16:50)  RR: 18 (21 Jul 2022 16:50)  SpO2: 99% (21 Jul 2022 16:50) (96% - 100%)  temp max in last 48H T(F): , Max: 99.8 (07-21-22 @ 16:50)    General:  No acute distress.  Eye: Pupils are equal, round and reactive to light, Extraocular movements are intact, Normal conjunctiva.  HENT: Normocephalic, Oral mucosa is moist, No pharyngeal erythema, No sinus tenderness.  Neck: Supple, No lymphadenopathy.  Respiratory: Lungs are clear to auscultation, Respirations are non-labored.  Cardiovascular: Normal rate, Regular rhythm,    Gastrointestinal: Soft, Non-tender, Non-distended, Normal bowel sounds.  Genitourinary: No costovertebral angle tenderness.  Lymphatics: No lymphadenopathy neck,   Musculoskeletal: Normal range of motion, Normal strength. lue dressing intact  Integumentary: No rash.  Neurologic: Alert, Oriented, No focal deficits, Cranial Nerves II-XII are grossly intact.  Psychiatric: Appropriate mood & affect.  =======================================================  Labs:                        14.9   15.95 )-----------( 272      ( 21 Jul 2022 06:30 )             45.0     07-21    138  |  100  |  6.9<L>  ----------------------------<  109<H>  3.6   |  25.0  |  0.71    Ca    9.0      21 Jul 2022 06:30        Culture - Blood (collected 07-19-22 @ 14:40)  Source: .Blood Blood    Culture - Abscess with Gram Stain (collected 07-19-22 @ 13:00)  Source: .Abscess Arm - Left  Organism: Staphylococcus aureus (07-21-22 @ 16:49)  Organism: Staphylococcus aureus (07-21-22 @ 16:49)    Sensitivities:      -  Ampicillin/Sulbactam: S <=8/4      -  Cefazolin: S <=4      -  Clindamycin: S <=0.25      -  Erythromycin: S <=0.25      -  Gentamicin: S <=1 Should not be used as monotherapy      -  Oxacillin: S 0.5 Oxacillin predicts susceptibility for dicloxacillin, methicillin, and nafcillin      -  Penicillin: R >8      -  Rifampin: S <=1 Should not be used as monotherapy      -  Tetra/Doxy: S <=1      -  Trimethoprim/Sulfamethoxazole: S <=0.5/9.5      -  Vancomycin: S 2      Method Type: TYRONE

## 2022-07-22 PROCEDURE — 99232 SBSQ HOSP IP/OBS MODERATE 35: CPT

## 2022-07-22 RX ORDER — CEFAZOLIN SODIUM 1 G
2000 VIAL (EA) INJECTION EVERY 8 HOURS
Refills: 0 | Status: DISCONTINUED | OUTPATIENT
Start: 2022-07-22 | End: 2022-07-28

## 2022-07-22 RX ADMIN — Medication 100 MILLIGRAM(S): at 13:05

## 2022-07-22 RX ADMIN — OXYCODONE HYDROCHLORIDE 10 MILLIGRAM(S): 5 TABLET ORAL at 22:39

## 2022-07-22 RX ADMIN — OXYCODONE HYDROCHLORIDE 10 MILLIGRAM(S): 5 TABLET ORAL at 18:45

## 2022-07-22 RX ADMIN — Medication 100 MILLIGRAM(S): at 21:05

## 2022-07-22 RX ADMIN — OXYCODONE HYDROCHLORIDE 10 MILLIGRAM(S): 5 TABLET ORAL at 05:33

## 2022-07-22 RX ADMIN — Medication 100 MILLIGRAM(S): at 05:32

## 2022-07-22 RX ADMIN — OXYCODONE HYDROCHLORIDE 10 MILLIGRAM(S): 5 TABLET ORAL at 23:09

## 2022-07-22 RX ADMIN — OXYCODONE HYDROCHLORIDE 10 MILLIGRAM(S): 5 TABLET ORAL at 06:53

## 2022-07-22 RX ADMIN — OXYCODONE HYDROCHLORIDE 10 MILLIGRAM(S): 5 TABLET ORAL at 00:40

## 2022-07-22 RX ADMIN — OXYCODONE HYDROCHLORIDE 10 MILLIGRAM(S): 5 TABLET ORAL at 10:45

## 2022-07-22 RX ADMIN — PIPERACILLIN AND TAZOBACTAM 25 GRAM(S): 4; .5 INJECTION, POWDER, LYOPHILIZED, FOR SOLUTION INTRAVENOUS at 05:33

## 2022-07-22 RX ADMIN — OXYCODONE HYDROCHLORIDE 10 MILLIGRAM(S): 5 TABLET ORAL at 17:45

## 2022-07-22 RX ADMIN — OXYCODONE HYDROCHLORIDE 10 MILLIGRAM(S): 5 TABLET ORAL at 11:45

## 2022-07-22 NOTE — PROGRESS NOTE ADULT - SUBJECTIVE AND OBJECTIVE BOX
TRAY MCCLELLAN  ----------------------------------------  The patient was seen at bedside. Patient with cellulitis. Noted left hand discomfort.    Vital Signs Last 24 Hrs  T(C): 36.7 (22 Jul 2022 10:21), Max: 37.7 (21 Jul 2022 16:50)  T(F): 98.1 (22 Jul 2022 10:21), Max: 99.8 (21 Jul 2022 16:50)  HR: 67 (22 Jul 2022 10:21) (67 - 86)  BP: 136/80 (22 Jul 2022 10:21) (136/80 - 150/72)  BP(mean): --  RR: 16 (22 Jul 2022 10:21) (16 - 20)  SpO2: 98% (22 Jul 2022 10:21) (98% - 99%)    Parameters below as of 22 Jul 2022 10:21  Patient On (Oxygen Delivery Method): room air    PHYSICAL EXAMINATION:  ----------------------------------------  General appearance: No acute distress, Awake, Alert  HEENT: Normocephalic, Atraumatic, Conjunctiva clear, EOMI  Neck: Supple, No JVD, No tenderness  Lungs: Breath sound equal bilaterally, No wheezes, No rales  Cardiovascular: S1S2, Regular rhythm  Abdomen: Soft, Nontender, Nondistended, No guarding/rebound, Positive bowel sounds  Extremities: No clubbing, No cyanosis, No calf tenderness, Left hand swelling with dressing in place  Neuro: Strength equal bilaterally, No tremors  Psychiatric: Appropriate mood, Normal affect    LABORATORY STUDIES:  ----------------------------------------             14.9   15.95 )-----------( 272      ( 21 Jul 2022 06:30 )             45.0     07-21    138  |  100  |  6.9<L>  ----------------------------<  109<H>  3.6   |  25.0  |  0.71    Ca    9.0      21 Jul 2022 06:30    Culture - Blood (collected 19 Jul 2022 14:40)  Source: .Blood Blood  Preliminary Report (20 Jul 2022 19:02):    No growth to date.    MEDICATIONS  (STANDING):  ceFAZolin   IVPB 2000 milliGRAM(s) IV Intermittent every 8 hours  lactated ringers. 1000 milliLiter(s) (75 mL/Hr) IV Continuous <Continuous>  sodium chloride 0.9%. 1000 milliLiter(s) (125 mL/Hr) IV Continuous <Continuous>    MEDICATIONS  (PRN):  fentaNYL    Injectable 25 MICROGram(s) IV Push every 5 minutes PRN Moderate Pain (4 - 6)  HYDROmorphone  Injectable 0.5 milliGRAM(s) IV Push every 10 minutes PRN Severe Pain (7 - 10)  ibuprofen  Tablet. 400 milliGRAM(s) Oral three times a day PRN Temp greater or equal to 38C (100.4F), Moderate Pain (4 - 6)  ondansetron Injectable 4 milliGRAM(s) IV Push once PRN Nausea and/or Vomiting  oxyCODONE    IR 5 milliGRAM(s) Oral every 3 hours PRN Mild Pain (1 - 3)  oxyCODONE    IR 10 milliGRAM(s) Oral every 3 hours PRN Moderate Pain (4 - 6)      ASSESSMENT / PLAN:  ----------------------------------------  26M without significant medical history who presented to [facility] after injury to his hand with resulting swelling and pain. He was transferred to [facility] for further evaluation for concern of tenosynovitis. The patient underwent bedside incision and drainage but had worsened symptoms and underwent a second procedure in the operating room.    Cellulitis / Abscess - Wound culture grew MSSA. Blood culture was without growth. Analgesic medications as needed. On cefazolin. Repeat wound culture results to be reviewed when available. Orthopedic Surgery follow up noted, for drain removal tomorrow. MRI of the hand pending.

## 2022-07-22 NOTE — PROGRESS NOTE ADULT - ASSESSMENT
26 year old male with no known significant medical history is sent from [facility] for a hand tenosynovitis and possible deeper tissue abscess collection in the left hand. As per the patient he put his hand in a glove and felt something sharp and had about a one cm cut and over the past two days the area got red and swollen and painful and now he has the thumb swollen tender red hand. Accompanied with fever and nausea no chills. s/p bedside I+D but developed worsening erythema up forearm.    Left hand cellulitis/abscess/tenosynovitis s/p I+D left hand carpal tunnel release  Leukocytosis    - f/u BCX ngtd  - f/u wound CX MSstaph aureus  - s/p I+D left hand carpal tunnel release  - f/u Or cx  - dc vanco zosyn and clinda--->cefazolin 2 g IV q8h  - pt reports getting tetanus vaccine 3-4 months ago  - ortho f/u  - Trend Fever  - Trend Leukocytosis  - will need IV abx to be set up when ready for DC. penrose to be removed tomorrow by ortho. c/w IV abx and inpatient monitoring for now    d/w pharmacy  Will Follow

## 2022-07-22 NOTE — PROGRESS NOTE ADULT - SUBJECTIVE AND OBJECTIVE BOX
[facility]                                                INFECTIOUS DISEASES  =======================================================                               Jose Acosta MD#  Delano Mancia MD*                                     Dieudonne Taylor MD*    Kate Singh MD*            Diplomates American Board of Internal Medicine & Infectious Diseases                  # [place] Office - Appt - Tel  [phone] Fax [phone]                * [place] Office - Appt - Tel [phone] Fax [phone]                                  Hospital Consult line:  [phone]  =======================================================      [id number]  TRAY MCCLELLAN   follow up for: left hand cellulitis    feels well  pain better  afebrile  wbc 15 still elevated  patient seen and examined.       I have personally reviewed the labs and data; pertinent labs and data are listed in this note; please see below.   ===================================================  REVIEW OF SYSTEMS:  CONSTITUTIONAL:  No Fever or chills  HEENT:  No diplopia or blurred vision.  No earache, sore throat or runny nose.  CARDIOVASCULAR:  No pressure, squeezing, strangling, tightness, heaviness or aching about the chest, neck, axilla or epigastrium.  RESPIRATORY:  No cough, shortness of breath  GASTROINTESTINAL:  No nausea, vomiting or diarrhea.  GENITOURINARY:  No dysuria, frequency or urgency. No Blood in urine  MUSCULOSKELETAL:  no joint aches, no muscle pain  SKIN:  No change in skin, hair or nails.  NEUROLOGIC:  No Headaches, seizures or weakness.  PSYCHIATRIC:  No disorder of thought or mood.  ENDOCRINE:  No heat or cold intolerance  HEMATOLOGICAL:  No easy bruising or bleeding.    =======================================================  Allergies    No Known Allergies    Intolerances    Antibiotics:  ceFAZolin   IVPB 2000 milliGRAM(s) IV Intermittent every 8 hours    Other medications:  lactated ringers. 1000 milliLiter(s) IV Continuous <Continuous>  sodium chloride 0.9%. 1000 milliLiter(s) IV Continuous <Continuous>    ======================================================  Physical Exam:  ============  T(F): 98.1 (22 Jul 2022 10:21), Max: 99.8 (21 Jul 2022 16:50)  HR: 67 (22 Jul 2022 10:21)  BP: 136/80 (22 Jul 2022 10:21)  RR: 16 (22 Jul 2022 10:21)  SpO2: 98% (22 Jul 2022 10:21) (98% - 99%)  temp max in last 48H T(F): , Max: 99.8 (07-21-22 @ 16:50)    General:  No acute distress.  Eye: Pupils are equal, round and reactive to light, Extraocular movements are intact, Normal conjunctiva.  HENT: Normocephalic, Oral mucosa is moist, No pharyngeal erythema, No sinus tenderness.  Neck: Supple, No lymphadenopathy.  Respiratory: Lungs are clear to auscultation, Respirations are non-labored.  Cardiovascular: Normal rate, Regular rhythm,    Gastrointestinal: Soft, Non-tender, Non-distended, Normal bowel sounds.  Genitourinary: No costovertebral angle tenderness.  Lymphatics: No lymphadenopathy neck,   Musculoskeletal: Normal range of motion, Normal strength. left hand dressing intact  Integumentary: No rash.  Neurologic: Alert, Oriented, No focal deficits, Cranial Nerves II-XII are grossly intact.  Psychiatric: Appropriate mood & affect.  =======================================================  Labs:                        14.9   15.95 )-----------( 272      ( 21 Jul 2022 06:30 )             45.0     07-21    138  |  100  |  6.9<L>  ----------------------------<  109<H>  3.6   |  25.0  |  0.71    Ca    9.0      21 Jul 2022 06:30        Culture - Blood (collected 07-19-22 @ 14:40)  Source: .Blood Blood    Culture - Abscess with Gram Stain (collected 07-19-22 @ 13:00)  Source: .Abscess Arm - Left  Organism: Staphylococcus aureus (07-21-22 @ 16:49)  Organism: Staphylococcus aureus (07-21-22 @ 16:49)    Sensitivities:      -  Ampicillin/Sulbactam: S <=8/4      -  Cefazolin: S <=4      -  Clindamycin: S <=0.25      -  Erythromycin: S <=0.25      -  Gentamicin: S <=1 Should not be used as monotherapy      -  Oxacillin: S 0.5 Oxacillin predicts susceptibility for dicloxacillin, methicillin, and nafcillin      -  Penicillin: R >8      -  Rifampin: S <=1 Should not be used as monotherapy      -  Tetra/Doxy: S <=1      -  Trimethoprim/Sulfamethoxazole: S <=0.5/9.5      -  Vancomycin: S 2      Method Type: TYRONE

## 2022-07-22 NOTE — PROGRESS NOTE ADULT - SUBJECTIVE AND OBJECTIVE BOX
Patient was seen and examined at approximately 8:30am    ORTHO-TRAUMA SERVICE      Pt Name: TRAY MCCLELLAN    MRN: [id number]    ED  Madeline     Patient is a 26y.o male being followed for Left hand thumb abscess s/p I&D POD 2. Patient seen bedside with LUE elevated. Patient endorses pain and swelling has significantly improved from yesterday. Endorse all digits except thumb improved ROM. Patient reports forearm and wrist swelling improved since yesterday. Patient endorses feeling feverish over night but in all better then prior days. Denies currently feeling feverish, chills, other orthopedic complaints.       PAST MEDICAL & SURGICAL HISTORY:  PAST MEDICAL & SURGICAL HISTORY:      Allergies: No Known Allergies      Medications: clindamycin IVPB      clindamycin IVPB 600 milliGRAM(s) IV Intermittent every 8 hours  fentaNYL    Injectable 25 MICROGram(s) IV Push every 5 minutes PRN  HYDROmorphone  Injectable 0.5 milliGRAM(s) IV Push every 10 minutes PRN  ibuprofen  Tablet. 400 milliGRAM(s) Oral three times a day PRN  lactated ringers. 1000 milliLiter(s) IV Continuous <Continuous>  ondansetron Injectable 4 milliGRAM(s) IV Push once PRN  oxyCODONE    IR 5 milliGRAM(s) Oral every 3 hours PRN  oxyCODONE    IR 10 milliGRAM(s) Oral every 3 hours PRN  piperacillin/tazobactam IVPB.. 3.375 Gram(s) IV Intermittent every 8 hours  sodium chloride 0.9%. 1000 milliLiter(s) IV Continuous <Continuous>                            14.9   15.95 )-----------( 272      ( 21 Jul 2022 06:30 )             45.0     07-21    138  |  100  |  6.9<L>  ----------------------------<  109<H>  3.6   |  25.0  |  0.71    Ca    9.0      21 Jul 2022 06:30        PHYSICAL EXAM:    Vital Signs Last 24 Hrs  T(C): 36.7 (22 Jul 2022 05:00), Max: 37.7 (21 Jul 2022 16:50)  T(F): 98 (22 Jul 2022 05:00), Max: 99.8 (21 Jul 2022 16:50)  HR: 82 (22 Jul 2022 05:00) (79 - 86)  BP: 144/78 (22 Jul 2022 05:00) (136/81 - 150/72)  BP(mean): --  RR: 20 (22 Jul 2022 05:00) (18 - 20)  SpO2: 98% (22 Jul 2022 05:00) (98% - 99%)    Parameters below as of 22 Jul 2022 05:00  Patient On (Oxygen Delivery Method): room air        Appearance: Alert, responsive, in no acute distress.    Neurological: Sensation is grossly intact to light touch. No focal deficits or weaknesses found.    Vascular: 2+ radial pulses. Cap refill < 2 sec. No extremity ulcerations. No cyanosis.    Musculoskeletal:         Left Upper Extremity: Ace/bulky dressing removed. purulent/bloody staining noted on gauze. 2 penrose drains intact. + ROM with minimal pain elicited in digits 2-5. Thumb limited to ROM due to swelling but endorses sensation improved to palpation. Betadine soak. New clean bulky dressing placed with ace wrap. Compartments soft, compressible.       A/P:  Pt is a  26y Male s/p I&D left hand, left carpal tunnel release POD 2    PLAN:   - Daily betadine soaks  - Elevate LUE   - Continue penrose drains - to be removed on 7/23  - Pain control   - F/u OR cx   - Continue care per primary team

## 2022-07-23 LAB
-  AMPICILLIN/SULBACTAM: SIGNIFICANT CHANGE UP
-  CEFAZOLIN: SIGNIFICANT CHANGE UP
-  CLINDAMYCIN: SIGNIFICANT CHANGE UP
-  ERYTHROMYCIN: SIGNIFICANT CHANGE UP
-  GENTAMICIN: SIGNIFICANT CHANGE UP
-  OXACILLIN: SIGNIFICANT CHANGE UP
-  PENICILLIN: SIGNIFICANT CHANGE UP
-  RIFAMPIN: SIGNIFICANT CHANGE UP
-  TETRACYCLINE: SIGNIFICANT CHANGE UP
-  TRIMETHOPRIM/SULFAMETHOXAZOLE: SIGNIFICANT CHANGE UP
-  VANCOMYCIN: SIGNIFICANT CHANGE UP
METHOD TYPE: SIGNIFICANT CHANGE UP

## 2022-07-23 PROCEDURE — 99232 SBSQ HOSP IP/OBS MODERATE 35: CPT

## 2022-07-23 RX ORDER — MUPIROCIN 20 MG/G
1 OINTMENT TOPICAL
Refills: 0 | Status: DISCONTINUED | OUTPATIENT
Start: 2022-07-23 | End: 2022-07-28

## 2022-07-23 RX ADMIN — Medication 100 MILLIGRAM(S): at 05:22

## 2022-07-23 RX ADMIN — OXYCODONE HYDROCHLORIDE 10 MILLIGRAM(S): 5 TABLET ORAL at 06:00

## 2022-07-23 RX ADMIN — Medication 100 MILLIGRAM(S): at 21:55

## 2022-07-23 RX ADMIN — HYDROMORPHONE HYDROCHLORIDE 0.5 MILLIGRAM(S): 2 INJECTION INTRAMUSCULAR; INTRAVENOUS; SUBCUTANEOUS at 08:10

## 2022-07-23 RX ADMIN — OXYCODONE HYDROCHLORIDE 10 MILLIGRAM(S): 5 TABLET ORAL at 05:28

## 2022-07-23 RX ADMIN — HYDROMORPHONE HYDROCHLORIDE 0.5 MILLIGRAM(S): 2 INJECTION INTRAMUSCULAR; INTRAVENOUS; SUBCUTANEOUS at 07:40

## 2022-07-23 RX ADMIN — OXYCODONE HYDROCHLORIDE 10 MILLIGRAM(S): 5 TABLET ORAL at 22:25

## 2022-07-23 RX ADMIN — Medication 100 MILLIGRAM(S): at 14:05

## 2022-07-23 RX ADMIN — OXYCODONE HYDROCHLORIDE 10 MILLIGRAM(S): 5 TABLET ORAL at 21:55

## 2022-07-23 NOTE — PROGRESS NOTE ADULT - SUBJECTIVE AND OBJECTIVE BOX
Patient seen and eval at bedside with hospital . Patient has no complaints. Denies CP , SOB, dizziness, fever, chills.  Patient states his left hand feels better and he is able to move his thumb a little now.     Vital Signs Last 24 Hrs  T(C): 36.9 (23 Jul 2022 04:50), Max: 36.9 (23 Jul 2022 04:50)  T(F): 98.4 (23 Jul 2022 04:50), Max: 98.4 (23 Jul 2022 04:50)  HR: 88 (23 Jul 2022 04:50) (67 - 92)  BP: 144/85 (23 Jul 2022 04:50) (136/80 - 144/85)  BP(mean): --  RR: 20 (23 Jul 2022 04:50) (16 - 20)  SpO2: 98% (23 Jul 2022 04:50) (98% - 98%)    PE: NAD, awake alert  Left hand: Penrose drains inside incision wounds x 2 palmar and dorsal 1st webspace. No purulent drainage noted, some mild blood drainage. Incisions healing well volar wrist and hypothenar eminence region. open lesion on thumb clean, gross swelling thumb and palmar area, no significant erythema noted. Rad pulse 2+, Intrinsics/PIN intact AIN diminished from swelling and pain.   SILT rad/uln/med distrib, compartments soft. compressible    Culture - Abscess with Gram Stain (07.21.22 @ 11:40)    Specimen Source: .Abscess L Thumb Infection    Culture Results:   Numerous Staphylococcus aureus      A/P: 25 yo M s/p left hand I&D with carpal tunnel release POD#3    ·	D/w Dr. Aldana - penrose drains removed  ·	Hand soaked in batadine/saline mixture x 15 minutes folllowed wash with sterile water and dry gauze dressing placed with cling.  ·	NWB left hand  ·	Pain control  ·	IV abx as per ID  ·	F/u cultures  ·	Cont care as per primary team  ·	Will monitor       Patient seen and eval at bedside with hospital . Patient has no complaints. Denies CP , SOB, dizziness, fever, chills.  Patient states his left hand feels better and he is able to move his thumb a little now.     Vital Signs Last 24 Hrs  T(C): 36.9 (23 Jul 2022 04:50), Max: 36.9 (23 Jul 2022 04:50)  T(F): 98.4 (23 Jul 2022 04:50), Max: 98.4 (23 Jul 2022 04:50)  HR: 88 (23 Jul 2022 04:50) (67 - 92)  BP: 144/85 (23 Jul 2022 04:50) (136/80 - 144/85)  BP(mean): --  RR: 20 (23 Jul 2022 04:50) (16 - 20)  SpO2: 98% (23 Jul 2022 04:50) (98% - 98%)    PE: NAD, awake alert  Left hand: Penrose drains inside incision wounds x 2 palmar and dorsal 1st webspace. No purulent drainage noted, some mild blood drainage. Incisions healing well volar wrist and hypothenar eminence region. open lesion on thumb clean, gross swelling thumb and palmar area, no significant erythema noted. Rad pulse 2+, Intrinsics/PIN intact AIN diminished from swelling and pain.   SILT rad/uln/med distrib, compartments soft. compressible    Culture - Abscess with Gram Stain (07.21.22 @ 11:40)    Specimen Source: .Abscess L Thumb Infection    Culture Results:   Numerous Staphylococcus aureus      A/P: 27 yo M s/p left hand I&D with carpal tunnel release POD#3    ·	D/w Dr. Aldana - penrose drains removed  ·	Hand soaked in batadine/saline mixture x 15 minutes folllowed wash with sterile water and dry gauze dressing placed with cling. Bactroban to be applied daily beginning 7/24/22 during daily dressing changes to the open wounds. Bactroban ordered.  ·	NWB left hand  ·	Pain control  ·	IV abx as per ID  ·	F/u cultures  ·	Cont care as per primary team  ·	Will monitor  ·	D/w Dr. Aldana

## 2022-07-23 NOTE — PROGRESS NOTE ADULT - SUBJECTIVE AND OBJECTIVE BOX
TRAY BLANKENSHIP    [id number]    26y      Male    INTERVAL HPI/OVERNIGHT EVENTS: patient being seen for tenosynovitis. Patient seen at bedside with phone .     patient states feeling better    REVIEW OF SYSTEMS:    CONSTITUTIONAL: No fever, weight loss, or fatigue  RESPIRATORY: No cough, wheezing, hemoptysis; No shortness of breath  CARDIOVASCULAR: No chest pain, palpitations  GASTROINTESTINAL: No abdominal or epigastric pain. No nausea, vomiting  NEUROLOGICAL: No headaches, memory loss, loss of strength.  MISCELLANEOUS:      Vital Signs Last 24 Hrs  T(C): 37 (23 Jul 2022 09:38), Max: 37 (23 Jul 2022 09:38)  T(F): 98.6 (23 Jul 2022 09:38), Max: 98.6 (23 Jul 2022 09:38)  HR: 77 (23 Jul 2022 09:38) (77 - 88)  BP: 146/88 (23 Jul 2022 09:38) (144/85 - 146/88)  BP(mean): --  RR: 18 (23 Jul 2022 09:38) (18 - 20)  SpO2: 95% (23 Jul 2022 09:38) (95% - 98%)    Parameters below as of 23 Jul 2022 09:38  Patient On (Oxygen Delivery Method): room air        PHYSICAL EXAM:    General appearance: No acute distress, Awake, Alert  HEENT: Normocephalic, Atraumatic, Conjunctiva clear, EOMI  Neck: Supple, No JVD, No tenderness  Lungs: Breath sound equal bilaterally, No wheezes, No rales  Cardiovascular: S1S2, Regular rhythm  Abdomen: Soft, Nontender, Nondistended, No guarding/rebound, Positive bowel sounds  Extremities: Left hand swelling with dressing in place  Neuro: Strength equal bilaterally, No tremors  Psychiatric: Appropriate mood, Normal affect      LABS:        MEDICATIONS  (STANDING):  ceFAZolin   IVPB 2000 milliGRAM(s) IV Intermittent every 8 hours  mupirocin 2% Ointment 1 Application(s) Topical <User Schedule>    MEDICATIONS  (PRN):  fentaNYL    Injectable 25 MICROGram(s) IV Push every 5 minutes PRN Moderate Pain (4 - 6)  HYDROmorphone  Injectable 0.5 milliGRAM(s) IV Push every 10 minutes PRN Severe Pain (7 - 10)  ibuprofen  Tablet. 400 milliGRAM(s) Oral three times a day PRN Temp greater or equal to 38C (100.4F), Moderate Pain (4 - 6)  ondansetron Injectable 4 milliGRAM(s) IV Push once PRN Nausea and/or Vomiting  oxyCODONE    IR 5 milliGRAM(s) Oral every 3 hours PRN Mild Pain (1 - 3)  oxyCODONE    IR 10 milliGRAM(s) Oral every 3 hours PRN Moderate Pain (4 - 6)      RADIOLOGY & ADDITIONAL TESTS:

## 2022-07-23 NOTE — PROGRESS NOTE ADULT - ASSESSMENT
26M without significant medical history who presented to [facility] after injury to his hand with resulting swelling and pain. He was transferred to [facility] for further evaluation for concern of tenosynovitis. The patient underwent bedside incision and drainage but had worsened symptoms and underwent a second procedure in the operating room.    Cellulitis / Abscess - Wound culture grew MSSA. Blood culture was without growth. Analgesic medications as needed. On cefazolin.    Orthopedic Surgery follow up noted,   mri hand pending

## 2022-07-24 LAB
ALBUMIN SERPL ELPH-MCNC: 4.2 G/DL — SIGNIFICANT CHANGE UP (ref 3.3–5.2)
ALP SERPL-CCNC: 127 U/L — HIGH (ref 40–120)
ALT FLD-CCNC: 82 U/L — HIGH
ANION GAP SERPL CALC-SCNC: 15 MMOL/L — SIGNIFICANT CHANGE UP (ref 5–17)
AST SERPL-CCNC: 38 U/L — SIGNIFICANT CHANGE UP
BASOPHILS # BLD AUTO: 0.1 K/UL — SIGNIFICANT CHANGE UP (ref 0–0.2)
BASOPHILS NFR BLD AUTO: 0.9 % — SIGNIFICANT CHANGE UP (ref 0–2)
BILIRUB SERPL-MCNC: 0.4 MG/DL — SIGNIFICANT CHANGE UP (ref 0.4–2)
BUN SERPL-MCNC: 13.7 MG/DL — SIGNIFICANT CHANGE UP (ref 8–20)
CALCIUM SERPL-MCNC: 9.9 MG/DL — SIGNIFICANT CHANGE UP (ref 8.6–10.2)
CHLORIDE SERPL-SCNC: 97 MMOL/L — LOW (ref 98–107)
CO2 SERPL-SCNC: 23 MMOL/L — SIGNIFICANT CHANGE UP (ref 22–29)
CREAT SERPL-MCNC: 0.77 MG/DL — SIGNIFICANT CHANGE UP (ref 0.5–1.3)
CULTURE RESULTS: SIGNIFICANT CHANGE UP
CULTURE RESULTS: SIGNIFICANT CHANGE UP
EGFR: 128 ML/MIN/1.73M2 — SIGNIFICANT CHANGE UP
EOSINOPHIL # BLD AUTO: 1.2 K/UL — HIGH (ref 0–0.5)
EOSINOPHIL NFR BLD AUTO: 10.8 % — HIGH (ref 0–6)
GLUCOSE SERPL-MCNC: 106 MG/DL — HIGH (ref 70–99)
HCT VFR BLD CALC: 49.4 % — SIGNIFICANT CHANGE UP (ref 39–50)
HGB BLD-MCNC: 16.6 G/DL — SIGNIFICANT CHANGE UP (ref 13–17)
IMM GRANULOCYTES NFR BLD AUTO: 1.3 % — SIGNIFICANT CHANGE UP (ref 0–1.5)
LYMPHOCYTES # BLD AUTO: 2.29 K/UL — SIGNIFICANT CHANGE UP (ref 1–3.3)
LYMPHOCYTES # BLD AUTO: 20.5 % — SIGNIFICANT CHANGE UP (ref 13–44)
MAGNESIUM SERPL-MCNC: 2.1 MG/DL — SIGNIFICANT CHANGE UP (ref 1.8–2.6)
MCHC RBC-ENTMCNC: 30.1 PG — SIGNIFICANT CHANGE UP (ref 27–34)
MCHC RBC-ENTMCNC: 33.6 GM/DL — SIGNIFICANT CHANGE UP (ref 32–36)
MCV RBC AUTO: 89.5 FL — SIGNIFICANT CHANGE UP (ref 80–100)
MONOCYTES # BLD AUTO: 0.76 K/UL — SIGNIFICANT CHANGE UP (ref 0–0.9)
MONOCYTES NFR BLD AUTO: 6.8 % — SIGNIFICANT CHANGE UP (ref 2–14)
NEUTROPHILS # BLD AUTO: 6.67 K/UL — SIGNIFICANT CHANGE UP (ref 1.8–7.4)
NEUTROPHILS NFR BLD AUTO: 59.7 % — SIGNIFICANT CHANGE UP (ref 43–77)
ORGANISM # SPEC MICROSCOPIC CNT: SIGNIFICANT CHANGE UP
ORGANISM # SPEC MICROSCOPIC CNT: SIGNIFICANT CHANGE UP
PLATELET # BLD AUTO: 372 K/UL — SIGNIFICANT CHANGE UP (ref 150–400)
POTASSIUM SERPL-MCNC: 4.1 MMOL/L — SIGNIFICANT CHANGE UP (ref 3.5–5.3)
POTASSIUM SERPL-SCNC: 4.1 MMOL/L — SIGNIFICANT CHANGE UP (ref 3.5–5.3)
PROT SERPL-MCNC: 8.3 G/DL — SIGNIFICANT CHANGE UP (ref 6.6–8.7)
RBC # BLD: 5.52 M/UL — SIGNIFICANT CHANGE UP (ref 4.2–5.8)
RBC # FLD: 12.6 % — SIGNIFICANT CHANGE UP (ref 10.3–14.5)
SODIUM SERPL-SCNC: 135 MMOL/L — SIGNIFICANT CHANGE UP (ref 135–145)
SPECIMEN SOURCE: SIGNIFICANT CHANGE UP
SPECIMEN SOURCE: SIGNIFICANT CHANGE UP
WBC # BLD: 11.16 K/UL — HIGH (ref 3.8–10.5)
WBC # FLD AUTO: 11.16 K/UL — HIGH (ref 3.8–10.5)

## 2022-07-24 PROCEDURE — 99232 SBSQ HOSP IP/OBS MODERATE 35: CPT

## 2022-07-24 RX ADMIN — OXYCODONE HYDROCHLORIDE 10 MILLIGRAM(S): 5 TABLET ORAL at 15:14

## 2022-07-24 RX ADMIN — OXYCODONE HYDROCHLORIDE 10 MILLIGRAM(S): 5 TABLET ORAL at 22:06

## 2022-07-24 RX ADMIN — Medication 100 MILLIGRAM(S): at 22:06

## 2022-07-24 RX ADMIN — Medication 100 MILLIGRAM(S): at 05:29

## 2022-07-24 RX ADMIN — MUPIROCIN 1 APPLICATION(S): 20 OINTMENT TOPICAL at 08:30

## 2022-07-24 RX ADMIN — OXYCODONE HYDROCHLORIDE 10 MILLIGRAM(S): 5 TABLET ORAL at 08:36

## 2022-07-24 RX ADMIN — OXYCODONE HYDROCHLORIDE 10 MILLIGRAM(S): 5 TABLET ORAL at 09:27

## 2022-07-24 RX ADMIN — Medication 100 MILLIGRAM(S): at 14:36

## 2022-07-24 RX ADMIN — OXYCODONE HYDROCHLORIDE 10 MILLIGRAM(S): 5 TABLET ORAL at 14:36

## 2022-07-24 NOTE — PROGRESS NOTE ADULT - SUBJECTIVE AND OBJECTIVE BOX
Patient seen and eval at bedside with tablet  service Mateus #[id number]. Patient states he is needing pain medication less and less and left hand is feeling a little better than yesterday and is able to move fingers including thumb better today. Patient does note some numbness and tingling around the left thumb. Denies fever, chills.    Vital Signs Last 24 Hrs  T(C): 36.6 (24 Jul 2022 05:30), Max: 36.8 (23 Jul 2022 16:55)  T(F): 97.8 (24 Jul 2022 05:30), Max: 98.3 (23 Jul 2022 16:55)  HR: 62 (24 Jul 2022 05:30) (62 - 91)  BP: 134/79 (24 Jul 2022 05:30) (129/- - 134/79)  BP(mean): --  RR: 18 (24 Jul 2022 05:30) (18 - 19)  SpO2: 97% (24 Jul 2022 05:30) (96% - 97%)    PE: NAD, awake alert  Left hand: Dressing has some mild dried bloody drainage. Palmar and thumb incisional wounds without active drainage bleeding or purulent drainage noted. Incisions healing well volar wrist and hypothenar eminence region. open lesion on thumb clean, gross swelling thumb and palmar area, no significant erythema noted. Small white macerated strip of skin noted between incisional wound where penrose was removed and lesion of volar pad of thumb. Rad pulse 2+, Intrinsics/PIN intact AIN diminished from swelling and pain however pincer slightly improved today than yesterday.   SILT rad/uln/med distrib, compartments soft. compressible    Culture - Abscess with Gram Stain (07.21.22 @ 11:40)    -  Ampicillin/Sulbactam: S <=8/4    -  Cefazolin: S <=4    -  Clindamycin: S <=0.25    -  Erythromycin: S <=0.25    -  Gentamicin: S <=1 Should not be used as monotherapy    -  Oxacillin: S 0.5 Oxacillin predicts susceptibility for dicloxacillin, methicillin, and nafcillin    -  Penicillin: R >8    -  Rifampin: S <=1 Should not be used as monotherapy    -  Tetra/Doxy: S <=1    -  Trimethoprim/Sulfamethoxazole: S <=0.5/9.5    -  Vancomycin: S 2    Specimen Source: .Abscess L Thumb Infection    Culture Results:   Numerous Staphylococcus aureus    Organism Identification: Staphylococcus aureus    Organism: Staphylococcus aureus    Method Type: TYRONE    A/P: 25 yo M s/p left hand I&D with carpal tunnel release POD#4    ·	Hand soaked in batadine/saline mixture x 15 minutes folllowed wash with sterile water and Bactroban applied to palmar, thumb and dorsal 1st webspace incisional wounds. Dry gauze dressing placed with cling.   ·	NWB left hand  ·	Pain control  ·	IV abx as per ID  ·	PICC line pending  ·	F/u cultures  ·	Cont care as per primary team  ·	Will monitor  ·	D/w Dr. Aldana

## 2022-07-24 NOTE — PROGRESS NOTE ADULT - ASSESSMENT
26M without significant medical history who presented to [facility] after injury to his hand with resulting swelling and pain. He was transferred to [facility] for further evaluation for concern of tenosynovitis. The patient underwent bedside incision and drainage but had worsened symptoms and underwent a second procedure in the operating room.    Cellulitis / Abscess - Wound culture grew MSSA. Blood culture was without growth. Analgesic medications as needed. On cefazolin.    Orthopedic Surgery follow up noted,   mri hand pending    dispo hiopeful dc in 1-2 days

## 2022-07-24 NOTE — PROGRESS NOTE ADULT - SUBJECTIVE AND OBJECTIVE BOX
TRAY BLANKENSHIP    [id number]    26y      Male    INTERVAL HPI/OVERNIGHT EVENTS: patient being seen for tenosynovitis. Patient seen at bedside and denies any complaints    REVIEW OF SYSTEMS:    CONSTITUTIONAL: No fever, weight loss, or fatigue  RESPIRATORY: No cough, wheezing, hemoptysis; No shortness of breath  CARDIOVASCULAR: No chest pain, palpitations  GASTROINTESTINAL: No abdominal or epigastric pain. No nausea, vomiting  NEUROLOGICAL: No headaches, memory loss, loss of strength.  MISCELLANEOUS:      Vital Signs Last 24 Hrs  T(C): 36.7 (24 Jul 2022 10:30), Max: 36.8 (23 Jul 2022 16:55)  T(F): 98.1 (24 Jul 2022 10:30), Max: 98.3 (23 Jul 2022 16:55)  HR: 91 (24 Jul 2022 10:30) (62 - 91)  BP: 141/88 (24 Jul 2022 10:30) (129/- - 141/88)  BP(mean): --  RR: 18 (24 Jul 2022 10:30) (18 - 19)  SpO2: 96% (24 Jul 2022 10:30) (96% - 97%)    Parameters below as of 24 Jul 2022 10:30  Patient On (Oxygen Delivery Method): room air        PHYSICAL EXAM:    General appearance: No acute distress, Awake, Alert  HEENT: Normocephalic, Atraumatic, Conjunctiva clear, EOMI  Neck: Supple, No JVD, No tenderness  Lungs: Breath sound equal bilaterally, No wheezes, No rales  Cardiovascular: S1S2, Regular rhythm  Abdomen: Soft, Nontender, Nondistended, No guarding/rebound, Positive bowel sounds  Extremities: Left hand swelling with dressing in place  Neuro: Strength equal bilaterally, No tremors  Psychiatric: Appropriate mood, Normal affect      LABS:                        16.6   11.16 )-----------( 372      ( 24 Jul 2022 07:18 )             49.4     07-24    135  |  97<L>  |  13.7  ----------------------------<  106<H>  4.1   |  23.0  |  0.77    Ca    9.9      24 Jul 2022 07:18  Mg     2.1     07-24    TPro  8.3  /  Alb  4.2  /  TBili  0.4  /  DBili  x   /  AST  38  /  ALT  82<H>  /  AlkPhos  127<H>  07-24            MEDICATIONS  (STANDING):  ceFAZolin   IVPB 2000 milliGRAM(s) IV Intermittent every 8 hours  mupirocin 2% Ointment 1 Application(s) Topical <User Schedule>    MEDICATIONS  (PRN):  fentaNYL    Injectable 25 MICROGram(s) IV Push every 5 minutes PRN Moderate Pain (4 - 6)  HYDROmorphone  Injectable 0.5 milliGRAM(s) IV Push every 10 minutes PRN Severe Pain (7 - 10)  ibuprofen  Tablet. 400 milliGRAM(s) Oral three times a day PRN Temp greater or equal to 38C (100.4F), Moderate Pain (4 - 6)  ondansetron Injectable 4 milliGRAM(s) IV Push once PRN Nausea and/or Vomiting  oxyCODONE    IR 5 milliGRAM(s) Oral every 3 hours PRN Mild Pain (1 - 3)  oxyCODONE    IR 10 milliGRAM(s) Oral every 3 hours PRN Moderate Pain (4 - 6)      RADIOLOGY & ADDITIONAL TESTS:

## 2022-07-25 PROCEDURE — 99232 SBSQ HOSP IP/OBS MODERATE 35: CPT

## 2022-07-25 RX ADMIN — OXYCODONE HYDROCHLORIDE 10 MILLIGRAM(S): 5 TABLET ORAL at 14:00

## 2022-07-25 RX ADMIN — Medication 100 MILLIGRAM(S): at 05:11

## 2022-07-25 RX ADMIN — OXYCODONE HYDROCHLORIDE 10 MILLIGRAM(S): 5 TABLET ORAL at 22:37

## 2022-07-25 RX ADMIN — Medication 100 MILLIGRAM(S): at 22:37

## 2022-07-25 RX ADMIN — OXYCODONE HYDROCHLORIDE 10 MILLIGRAM(S): 5 TABLET ORAL at 12:58

## 2022-07-25 RX ADMIN — Medication 100 MILLIGRAM(S): at 12:27

## 2022-07-25 RX ADMIN — MUPIROCIN 1 APPLICATION(S): 20 OINTMENT TOPICAL at 08:06

## 2022-07-25 RX ADMIN — OXYCODONE HYDROCHLORIDE 10 MILLIGRAM(S): 5 TABLET ORAL at 23:37

## 2022-07-25 NOTE — PROGRESS NOTE ADULT - SUBJECTIVE AND OBJECTIVE BOX
TRAY BLANKENSHIP    [id number]    History:  The patient is status post I&D left hand with carpal tunnel release, POD#5. He is doing well. C/O pain at wound site by first digit. Pain with touch and movement of thumb. No worsening of symptoms, some improvement per pt. +numbness in thumb. No changes in motor or sensory. No new complaints.      Vital Signs Last 24 Hrs  T(C): 36.9 (25 Jul 2022 09:32), Max: 36.9 (25 Jul 2022 09:32)  T(F): 98.4 (25 Jul 2022 09:32), Max: 98.4 (25 Jul 2022 09:32)  HR: 84 (25 Jul 2022 09:32) (75 - 91)  BP: 140/84 (25 Jul 2022 09:32) (120/75 - 141/88)  BP(mean): --  RR: 18 (25 Jul 2022 09:32) (18 - 18)  SpO2: 96% (25 Jul 2022 09:32) (96% - 99%)    Parameters below as of 25 Jul 2022 09:32  Patient On (Oxygen Delivery Method): room air      I&O's Summary                            16.6   11.16 )-----------( 372      ( 24 Jul 2022 07:18 )             49.4     07-24    135  |  97<L>  |  13.7  ----------------------------<  106<H>  4.1   |  23.0  |  0.77    Ca    9.9      24 Jul 2022 07:18  Mg     2.1     07-24    TPro  8.3  /  Alb  4.2  /  TBili  0.4  /  DBili  x   /  AST  38  /  ALT  82<H>  /  AlkPhos  127<H>  07-24      MEDICATIONS  (STANDING):  ceFAZolin   IVPB 2000 milliGRAM(s) IV Intermittent every 8 hours  mupirocin 2% Ointment 1 Application(s) Topical <User Schedule>    MEDICATIONS  (PRN):  fentaNYL    Injectable 25 MICROGram(s) IV Push every 5 minutes PRN Moderate Pain (4 - 6)  HYDROmorphone  Injectable 0.5 milliGRAM(s) IV Push every 10 minutes PRN Severe Pain (7 - 10)  ibuprofen  Tablet. 400 milliGRAM(s) Oral three times a day PRN Temp greater or equal to 38C (100.4F), Moderate Pain (4 - 6)  ondansetron Injectable 4 milliGRAM(s) IV Push once PRN Nausea and/or Vomiting  oxyCODONE    IR 5 milliGRAM(s) Oral every 3 hours PRN Mild Pain (1 - 3)  oxyCODONE    IR 10 milliGRAM(s) Oral every 3 hours PRN Moderate Pain (4 - 6)      Physical exam: Lying in bed in NAD, awake and alert  Left hand- +wound 1st digit volar side with granulation tissue prox phalanx and MCP. Decreased sensation distally at 1st digit. Limited ROM 1st digit IP/MCP. +ecchymosis. No drainage noted. Small wound dorsal, no drainage. palmar wounds healing well. No dc. No erythema. +swelling, improving. +TTP 1st digit. Sensation intact digits 2-5. +ROM digits 2-5 but limited due to swelling and pain. Radial pulse 2+. Brisk cap refill.    Primary Orthopedic Assessment:  • S/P I&D left hand with carpal tunnel release, POD#5    Culture - Abscess with Gram Stain (07.21.22 @ 11:40)    -  Ampicillin/Sulbactam: S <=8/4    -  Cefazolin: S <=4    -  Clindamycin: S <=0.25    -  Erythromycin: S <=0.25    -  Gentamicin: S <=1 Should not be used as monotherapy    -  Oxacillin: S 0.5 Oxacillin predicts susceptibility for dicloxacillin, methicillin, and nafcillin    -  Penicillin: R >8    -  Rifampin: S <=1 Should not be used as monotherapy    -  Tetra/Doxy: S <=1    -  Trimethoprim/Sulfamethoxazole: S <=0.5/9.5    -  Vancomycin: S 2    Specimen Source: .Abscess L Thumb Infection    Culture Results:   Numerous Staphylococcus aureus    Organism Identification: Staphylococcus aureus    Organism: Staphylococcus aureus    Method Type: TYORNE        Plan:   -wounds soaked in betadine/NS  - bactroban applied to open wounds on 1st digit  - dressing applied with 4x4 and cling  - continue daily soaks and bactroban dsg changes  - IV abx per ID  - pain control  - elevate left hand  - continue medical management

## 2022-07-25 NOTE — PROGRESS NOTE ADULT - SUBJECTIVE AND OBJECTIVE BOX
[facility]                                                INFECTIOUS DISEASES  =======================================================                               Jose Acosta MD#  Delano Mancia MD*                                     Dieudonne Taylor MD*    Kate Singh MD*            Diplomates American Board of Internal Medicine & Infectious Diseases                  # [place] Office - Appt - Tel  [phone] Fax [phone]                * [place] Office - Appt - Tel [phone] Fax [phone]                                  Hospital Consult line:  [phone]  =======================================================      [id number]  [employer]   follow up for: left hand infection, tenosynovitis  [id number]  pt feels well, pain and movement in hand has improved  patient seen and examined.       I have personally reviewed the labs and data; pertinent labs and data are listed in this note; please see below.   ===================================================  REVIEW OF SYSTEMS:  CONSTITUTIONAL:  No Fever or chills  HEENT:  No diplopia or blurred vision.  No earache, sore throat or runny nose.  CARDIOVASCULAR:  No pressure, squeezing, strangling, tightness, heaviness or aching about the chest, neck, axilla or epigastrium.  RESPIRATORY:  No cough, shortness of breath  GASTROINTESTINAL:  No nausea, vomiting or diarrhea.  GENITOURINARY:  No dysuria, frequency or urgency. No Blood in urine  MUSCULOSKELETAL:  no joint aches, no muscle pain  SKIN:  No change in skin, hair or nails.  NEUROLOGIC:  No Headaches, seizures or weakness.  PSYCHIATRIC:  No disorder of thought or mood.  ENDOCRINE:  No heat or cold intolerance  HEMATOLOGICAL:  No easy bruising or bleeding.    =======================================================  Allergies    No Known Allergies    Intolerances    Antibiotics:  ceFAZolin   IVPB 2000 milliGRAM(s) IV Intermittent every 8 hours    Other medications:  mupirocin 2% Ointment 1 Application(s) Topical <User Schedule>    ======================================================  Physical Exam:  ============  T(F): 99 (25 Jul 2022 16:23), Max: 99 (25 Jul 2022 16:23)  HR: 84 (25 Jul 2022 16:23)  BP: 131/78 (25 Jul 2022 16:23)  RR: 19 (25 Jul 2022 16:23)  SpO2: 96% (25 Jul 2022 16:23) (96% - 99%)  temp max in last 48H T(F): , Max: 99 (07-25-22 @ 16:23)    General:  No acute distress.  Eye: Pupils are equal, round and reactive to light, Extraocular movements are intact, Normal conjunctiva.  HENT: Normocephalic, Oral mucosa is moist, No pharyngeal erythema, No sinus tenderness.  Neck: Supple, No lymphadenopathy.  Respiratory: Lungs are clear to auscultation, Respirations are non-labored.  Cardiovascular: Normal rate, Regular rhythm,    Gastrointestinal: Soft, Non-tender, Non-distended, Normal bowel sounds.  Genitourinary: No costovertebral angle tenderness.  Lymphatics: No lymphadenopathy neck,   Musculoskeletal: left thumb with wound at palmar aspect no erythema no drainage + swelling of hand,  distal wound healing well palmar wounds healing well  Integumentary: No rash.  Neurologic: Alert, Oriented, No focal deficits, Cranial Nerves II-XII are grossly intact.  Psychiatric: Appropriate mood & affect.  =======================================================  Labs:                        16.6   11.16 )-----------( 372      ( 24 Jul 2022 07:18 )             49.4     07-24    135  |  97<L>  |  13.7  ----------------------------<  106<H>  4.1   |  23.0  |  0.77    Ca    9.9      24 Jul 2022 07:18  Mg     2.1     07-24    TPro  8.3  /  Alb  4.2  /  TBili  0.4  /  DBili  x   /  AST  38  /  ALT  82<H>  /  AlkPhos  127<H>  07-24      Culture - Abscess with Gram Stain (collected 07-21-22 @ 11:40)  Source: .Abscess L Thumb Infection  Organism: Staphylococcus aureus (07-23-22 @ 11:21)  Organism: Staphylococcus aureus (07-23-22 @ 11:21)    Sensitivities:      -  Ampicillin/Sulbactam: S <=8/4      -  Cefazolin: S <=4      -  Clindamycin: S <=0.25      -  Erythromycin: S <=0.25      -  Gentamicin: S <=1 Should not be used as monotherapy      -  Oxacillin: S 0.5 Oxacillin predicts susceptibility for dicloxacillin, methicillin, and nafcillin      -  Penicillin: R >8      -  Rifampin: S <=1 Should not be used as monotherapy      -  Tetra/Doxy: S <=1      -  Trimethoprim/Sulfamethoxazole: S <=0.5/9.5      -  Vancomycin: S 2      Method Type: TYRONE    Culture - Blood (collected 07-19-22 @ 14:40)  Source: .Blood Blood  Final Report (07-24-22 @ 19:01):    No Growth Final    Culture - Abscess with Gram Stain (collected 07-19-22 @ 13:00)  Source: .Abscess Arm - Left  Final Report (07-24-22 @ 17:48):    Numerous Staphylococcus aureus  Organism: Staphylococcus aureus (07-24-22 @ 17:48)  Organism: Staphylococcus aureus (07-24-22 @ 17:48)    Sensitivities:      -  Ampicillin/Sulbactam: S <=8/4      -  Cefazolin: S <=4      -  Clindamycin: S <=0.25      -  Erythromycin: S <=0.25      -  Gentamicin: S <=1 Should not be used as monotherapy      -  Oxacillin: S 0.5 Oxacillin predicts susceptibility for dicloxacillin, methicillin, and nafcillin      -  Penicillin: R >8      -  Rifampin: S <=1 Should not be used as monotherapy      -  Tetra/Doxy: S <=1      -  Trimethoprim/Sulfamethoxazole: S <=0.5/9.5      -  Vancomycin: S 2      Method Type: TYRONE                                                    [facility]                                                INFECTIOUS DISEASES  =======================================================                               Jose Acosta MD#  Delano Mancia MD*                                     Dieudonne Taylor MD*    Kate Singh MD*            Diplomates American Board of Internal Medicine & Infectious Diseases                  # [place] Office - Appt - Tel  [phone] Fax [phone]                * [place] Office - Appt - Tel [phone] Fax [phone]                                  Hospital Consult line:  [phone]  =======================================================      [id number]  [employer]   follow up for: left hand infection, tenosynovitis  [id number]  pt feels well, pain and movement in hand has improved  c/o nausea with abx infusion + epigastric pain  patient seen and examined.       I have personally reviewed the labs and data; pertinent labs and data are listed in this note; please see below.   ===================================================  REVIEW OF SYSTEMS:  CONSTITUTIONAL:  No Fever or chills  HEENT:  No diplopia or blurred vision.  No earache, sore throat or runny nose.  CARDIOVASCULAR:  No pressure, squeezing, strangling, tightness, heaviness or aching about the chest, neck, axilla or epigastrium.  RESPIRATORY:  No cough, shortness of breath  GASTROINTESTINAL:  No nausea, vomiting or diarrhea.  GENITOURINARY:  No dysuria, frequency or urgency. No Blood in urine  MUSCULOSKELETAL:  no joint aches, no muscle pain  SKIN:  No change in skin, hair or nails.  NEUROLOGIC:  No Headaches, seizures or weakness.  PSYCHIATRIC:  No disorder of thought or mood.  ENDOCRINE:  No heat or cold intolerance  HEMATOLOGICAL:  No easy bruising or bleeding.    =======================================================  Allergies    No Known Allergies    Intolerances    Antibiotics:  ceFAZolin   IVPB 2000 milliGRAM(s) IV Intermittent every 8 hours    Other medications:  mupirocin 2% Ointment 1 Application(s) Topical <User Schedule>    ======================================================  Physical Exam:  ============  T(F): 99 (25 Jul 2022 16:23), Max: 99 (25 Jul 2022 16:23)  HR: 84 (25 Jul 2022 16:23)  BP: 131/78 (25 Jul 2022 16:23)  RR: 19 (25 Jul 2022 16:23)  SpO2: 96% (25 Jul 2022 16:23) (96% - 99%)  temp max in last 48H T(F): , Max: 99 (07-25-22 @ 16:23)    General:  No acute distress.  Eye: Pupils are equal, round and reactive to light, Extraocular movements are intact, Normal conjunctiva.  HENT: Normocephalic, Oral mucosa is moist, No pharyngeal erythema, No sinus tenderness.  Neck: Supple, No lymphadenopathy.  Respiratory: Lungs are clear to auscultation, Respirations are non-labored.  Cardiovascular: Normal rate, Regular rhythm,    Gastrointestinal: Soft, Non-tender, Non-distended, Normal bowel sounds.  Genitourinary: No costovertebral angle tenderness.  Lymphatics: No lymphadenopathy neck,   Musculoskeletal: left thumb with wound at palmar aspect no erythema no drainage + swelling of hand,  distal wound healing well palmar wounds healing well  Integumentary: No rash.  Neurologic: Alert, Oriented, No focal deficits, Cranial Nerves II-XII are grossly intact.  Psychiatric: Appropriate mood & affect.  =======================================================  Labs:                        16.6   11.16 )-----------( 372      ( 24 Jul 2022 07:18 )             49.4     07-24    135  |  97<L>  |  13.7  ----------------------------<  106<H>  4.1   |  23.0  |  0.77    Ca    9.9      24 Jul 2022 07:18  Mg     2.1     07-24    TPro  8.3  /  Alb  4.2  /  TBili  0.4  /  DBili  x   /  AST  38  /  ALT  82<H>  /  AlkPhos  127<H>  07-24      Culture - Abscess with Gram Stain (collected 07-21-22 @ 11:40)  Source: .Abscess L Thumb Infection  Organism: Staphylococcus aureus (07-23-22 @ 11:21)  Organism: Staphylococcus aureus (07-23-22 @ 11:21)    Sensitivities:      -  Ampicillin/Sulbactam: S <=8/4      -  Cefazolin: S <=4      -  Clindamycin: S <=0.25      -  Erythromycin: S <=0.25      -  Gentamicin: S <=1 Should not be used as monotherapy      -  Oxacillin: S 0.5 Oxacillin predicts susceptibility for dicloxacillin, methicillin, and nafcillin      -  Penicillin: R >8      -  Rifampin: S <=1 Should not be used as monotherapy      -  Tetra/Doxy: S <=1      -  Trimethoprim/Sulfamethoxazole: S <=0.5/9.5      -  Vancomycin: S 2      Method Type: TYRONE    Culture - Blood (collected 07-19-22 @ 14:40)  Source: .Blood Blood  Final Report (07-24-22 @ 19:01):    No Growth Final    Culture - Abscess with Gram Stain (collected 07-19-22 @ 13:00)  Source: .Abscess Arm - Left  Final Report (07-24-22 @ 17:48):    Numerous Staphylococcus aureus  Organism: Staphylococcus aureus (07-24-22 @ 17:48)  Organism: Staphylococcus aureus (07-24-22 @ 17:48)    Sensitivities:      -  Ampicillin/Sulbactam: S <=8/4      -  Cefazolin: S <=4      -  Clindamycin: S <=0.25      -  Erythromycin: S <=0.25      -  Gentamicin: S <=1 Should not be used as monotherapy      -  Oxacillin: S 0.5 Oxacillin predicts susceptibility for dicloxacillin, methicillin, and nafcillin      -  Penicillin: R >8      -  Rifampin: S <=1 Should not be used as monotherapy      -  Tetra/Doxy: S <=1      -  Trimethoprim/Sulfamethoxazole: S <=0.5/9.5      -  Vancomycin: S 2      Method Type: TYRONE

## 2022-07-25 NOTE — PROGRESS NOTE ADULT - SUBJECTIVE AND OBJECTIVE BOX
[employer]    [id number]    26y      Male    INTERVAL HPI/OVERNIGHT EVENTS: patient being seen for tenosynovitis. Patient seen at bedside and denies any complaints    patient spoken to with language line.     REVIEW OF SYSTEMS:    CONSTITUTIONAL: No fever, weight loss, or fatigue  RESPIRATORY: No cough, wheezing, hemoptysis; No shortness of breath  CARDIOVASCULAR: No chest pain, palpitations  GASTROINTESTINAL: No abdominal or epigastric pain. No nausea, vomiting  NEUROLOGICAL: No headaches, memory loss, loss of strength.  MISCELLANEOUS:      Vital Signs Last 24 Hrs  T(C): 36.9 (25 Jul 2022 09:32), Max: 36.9 (25 Jul 2022 09:32)  T(F): 98.4 (25 Jul 2022 09:32), Max: 98.4 (25 Jul 2022 09:32)  HR: 84 (25 Jul 2022 09:32) (76 - 84)  BP: 140/84 (25 Jul 2022 09:32) (120/75 - 140/84)  BP(mean): --  RR: 18 (25 Jul 2022 09:32) (18 - 18)  SpO2: 96% (25 Jul 2022 09:32) (96% - 99%)    Parameters below as of 25 Jul 2022 09:32  Patient On (Oxygen Delivery Method): room air        PHYSICAL EXAM:    General appearance: No acute distress, Awake, Alert  HEENT: Normocephalic, Atraumatic, Conjunctiva clear, EOMI  Neck: Supple, No JVD, No tenderness  Lungs: Breath sound equal bilaterally, No wheezes, No rales  Cardiovascular: S1S2, Regular rhythm  Abdomen: Soft, Nontender, Nondistended, No guarding/rebound, Positive bowel sounds  Extremities: Left hand swelling with dressing in place  Neuro: Strength equal bilaterally, No tremors  Psychiatric: Appropriate mood, Normal affect    LABS:                        16.6   11.16 )-----------( 372      ( 24 Jul 2022 07:18 )             49.4     07-24    135  |  97<L>  |  13.7  ----------------------------<  106<H>  4.1   |  23.0  |  0.77    Ca    9.9      24 Jul 2022 07:18  Mg     2.1     07-24    TPro  8.3  /  Alb  4.2  /  TBili  0.4  /  DBili  x   /  AST  38  /  ALT  82<H>  /  AlkPhos  127<H>  07-24            MEDICATIONS  (STANDING):  ceFAZolin   IVPB 2000 milliGRAM(s) IV Intermittent every 8 hours  mupirocin 2% Ointment 1 Application(s) Topical <User Schedule>    MEDICATIONS  (PRN):  fentaNYL    Injectable 25 MICROGram(s) IV Push every 5 minutes PRN Moderate Pain (4 - 6)  HYDROmorphone  Injectable 0.5 milliGRAM(s) IV Push every 10 minutes PRN Severe Pain (7 - 10)  ibuprofen  Tablet. 400 milliGRAM(s) Oral three times a day PRN Temp greater or equal to 38C (100.4F), Moderate Pain (4 - 6)  ondansetron Injectable 4 milliGRAM(s) IV Push once PRN Nausea and/or Vomiting  oxyCODONE    IR 5 milliGRAM(s) Oral every 3 hours PRN Mild Pain (1 - 3)  oxyCODONE    IR 10 milliGRAM(s) Oral every 3 hours PRN Moderate Pain (4 - 6)      RADIOLOGY & ADDITIONAL TESTS:

## 2022-07-25 NOTE — PROGRESS NOTE ADULT - ASSESSMENT
26 year old male with no known significant medical history is [place] [facility] [place] a hand tenosynovitis and possible deeper tissue abscess collection in the left hand. As per the patient he put his hand in a glove and felt something sharp and had about a one cm cut and over the past two days the area got red and swollen and painful and now he has the thumb swollen tender red hand. Accompanied with fever and nausea no chills. s/p bedside I+D but developed worsening erythema up forearm.    Left hand cellulitis/abscess/tenosynovitis s/p I+D left hand carpal tunnel release  Leukocytosis    - f/u BCX ngtd  - f/u wound CX MS staph aureus  - s/p I+D left hand carpal tunnel release  - f/u Or cx MSSA  - MR hand is still pending  - suggest picc and IV abx at home  - c/w cefazolin 2 g IV q8h through 8/18/22 for 4 weeks, weekly CBC CMp ESR CRP. risks/benefits/alternatives explained to pt, he is agreeable for iv abx  - pt reports getting tetanus vaccine 3-4 months ago  - ortho f/u  - Trend Fever  - Trend Leukocytosis-improved    ID f/u 2 weeks    Will Follow 26 year old male with no known significant medical history [place] [facility] [place]osynovitis and possible deeper tissue abscess collection in the left hand. As per the patient he put his hand in a glove and felt something sharp and had about a one cm cut and over the past two days the area got red and swollen and painful and now he has the thumb swollen tender red hand. Accompanied with fever and nausea no chills. s/p bedside I+D but developed worsening erythema up forearm.    Left hand cellulitis/abscess/tenosynovitis s/p I+D left hand carpal tunnel release  Leukocytosis    - f/u BCX ngtd  - f/u wound CX MS staph aureus  - s/p I+D left hand carpal tunnel release  - f/u Or cx MSSA  - MR hand is still pending  - suggest picc and IV abx at home  - c/w cefazolin 2 g IV q8h through 8/18/22 for 4 weeks, weekly CBC CMp ESR CRP. risks/benefits/alternatives explained to pt, he is agreeable for iv abx  - pt reports getting tetanus vaccine 3-4 months ago  - ortho f/u  - Trend Fever  - Trend Leukocytosis-improved  - zofran/ppi as needed    ID f/u 2 weeks    Will Follow

## 2022-07-25 NOTE — PROGRESS NOTE ADULT - ASSESSMENT
26M without significant medical history who presented to [facility] after injury to his hand with resulting swelling and pain. He was transferred to [facility] for further evaluation for concern of tenosynovitis. The patient underwent bedside incision and drainage but had worsened symptoms and underwent a second procedure in the operating room.    Cellulitis / Abscess - Wound culture grew MSSA. Blood culture was without growth. Analgesic medications as needed. On cefazolin.    Orthopedic Surgery follow up noted,   mri hand pending  - ID following    dispo hiopeful dc in 1-2 days

## 2022-07-26 ENCOUNTER — TRANSCRIPTION ENCOUNTER (OUTPATIENT)
Age: 27
End: 2022-07-26

## 2022-07-26 LAB
CULTURE RESULTS: SIGNIFICANT CHANGE UP
ORGANISM # SPEC MICROSCOPIC CNT: SIGNIFICANT CHANGE UP
ORGANISM # SPEC MICROSCOPIC CNT: SIGNIFICANT CHANGE UP
SPECIMEN SOURCE: SIGNIFICANT CHANGE UP

## 2022-07-26 PROCEDURE — 76937 US GUIDE VASCULAR ACCESS: CPT | Mod: 26,59

## 2022-07-26 PROCEDURE — 99232 SBSQ HOSP IP/OBS MODERATE 35: CPT

## 2022-07-26 PROCEDURE — 36556 INSERT NON-TUNNEL CV CATH: CPT

## 2022-07-26 PROCEDURE — 73218 MRI UPPER EXTREMITY W/O DYE: CPT | Mod: 26,LT

## 2022-07-26 PROCEDURE — 76942 ECHO GUIDE FOR BIOPSY: CPT | Mod: 26,59

## 2022-07-26 RX ORDER — CEFAZOLIN SODIUM 1 G
2 VIAL (EA) INJECTION
Qty: 138 | Refills: 0
Start: 2022-07-26 | End: 2022-08-17

## 2022-07-26 RX ORDER — CEFAZOLIN SODIUM 1 G
2 VIAL (EA) INJECTION
Qty: 144 | Refills: 0
Start: 2022-07-26 | End: 2022-08-18

## 2022-07-26 RX ADMIN — Medication 100 MILLIGRAM(S): at 21:40

## 2022-07-26 RX ADMIN — Medication 400 MILLIGRAM(S): at 19:47

## 2022-07-26 RX ADMIN — Medication 100 MILLIGRAM(S): at 06:43

## 2022-07-26 RX ADMIN — Medication 400 MILLIGRAM(S): at 11:10

## 2022-07-26 RX ADMIN — Medication 400 MILLIGRAM(S): at 07:30

## 2022-07-26 RX ADMIN — Medication 100 MILLIGRAM(S): at 13:28

## 2022-07-26 RX ADMIN — Medication 400 MILLIGRAM(S): at 06:43

## 2022-07-26 RX ADMIN — MUPIROCIN 1 APPLICATION(S): 20 OINTMENT TOPICAL at 10:58

## 2022-07-26 NOTE — DISCHARGE NOTE PROVIDER - NSDCFUADDINST_GEN_ALL_CORE_FT
Ortho Hand: Non weight bearing Left Hand/Upper Extremity. Continue betadine/Normal Saline Soaks daily with Mupirocin ointment daily. Apply Clean dressing daily. Follow-up with Dr. Aldana within 1 week of discharge for further management and suture removal.  Ortho Hand: Non weight bearing Left Hand/Upper Extremity. Continue betadine/Normal Saline Soaks daily with Mupirocin ointment daily. Apply Clean dressing daily. Follow-up with Dr. Aldana within 1 week of discharge for further management and suture removal. Follow-up in office with Infectious Disease within 1 week.

## 2022-07-26 NOTE — PROGRESS NOTE ADULT - ASSESSMENT
26M without significant medical history who presented to [facility] after injury to his hand with resulting swelling and pain. He was transferred to [facility] for further evaluation for concern of tenosynovitis. The patient underwent bedside incision and drainage but had worsened symptoms and underwent a second procedure in the operating room. Patient had mri hand today and midline placed.     Cellulitis / Abscess - Wound culture grew MSSA. Blood culture was without growth. Analgesic medications as needed. On cefazolin.    Orthopedic Surgery follow up noted,   mri hand appreciated  - ID following  -midline placed

## 2022-07-26 NOTE — PROGRESS NOTE ADULT - ASSESSMENT
26 year old male with no known significant medical history is sent from [facility] [place] for a hand tenosynovitis and possible deeper tissue abscess collection in the left hand. As per the patient he put his hand in a glove and felt something sharp and had about a one cm cut and over the past two days the area got red and swollen and painful and now he has the thumb swollen tender red hand. Accompanied with fever and nausea no chills. s/p bedside I+D but developed worsening erythema up forearm.    Left hand cellulitis/abscess/tenosynovitis s/p I+D left hand carpal tunnel release  Leukocytosis    - f/u BCX ngtd  - f/u wound CX MS staph aureus  - s/p I+D left hand carpal tunnel release  - f/u Or cx MSSA  - MR hand pending  - suggest picc and IV abx at home  - c/w cefazolin 2 g IV q8h through 8/18/22 for 4 weeks, weekly CBC CMp ESR CRP. risks/benefits/alternatives explained to pt, he is agreeable for iv abx  - pt reports getting tetanus vaccine 3-4 months ago  - ortho f/u  - Trend Fever  - Trend Leukocytosis-improved  - zofran/ppi as needed    ID f/u 2 weeks    d/w attending, CM, ortho

## 2022-07-26 NOTE — DISCHARGE NOTE PROVIDER - NSDCCPCAREPLAN_GEN_ALL_CORE_FT
PRINCIPAL DISCHARGE DIAGNOSIS  Diagnosis: Cellulitis of left hand  Assessment and Plan of Treatment:       SECONDARY DISCHARGE DIAGNOSES  Diagnosis: Tenosynovitis  Assessment and Plan of Treatment:     Diagnosis: Acute osteomyelitis  Assessment and Plan of Treatment:

## 2022-07-26 NOTE — PROGRESS NOTE ADULT - SUBJECTIVE AND OBJECTIVE BOX
[facility]                                                INFECTIOUS DISEASES  =======================================================                               Jose Acosta MD#  Delano Mancia MD*                                     Dieudonne Taylor MD*    Kate Singh MD*            Diplomates American Board of Internal Medicine & Infectious Diseases                  # [place] Office - Appt - Tel  [phone] Fax [phone]                * [place] Office - Appt - Tel [phone] Fax [phone]                                  Hospital Consult line:  [phone]  =======================================================      [id number]  TRAY BLANKENSHIP   follow up for: left hand infection, tenosynovitis   [id number]  pt reported some nose bleeding after mupirocin  patient seen and examined.       I have personally reviewed the labs and data; pertinent labs and data are listed in this note; please see below.   ===================================================  REVIEW OF SYSTEMS:  CONSTITUTIONAL:  No Fever or chills  HEENT:  No diplopia or blurred vision.  No earache, sore throat or runny nose.  CARDIOVASCULAR:  No pressure, squeezing, strangling, tightness, heaviness or aching about the chest, neck, axilla or epigastrium.  RESPIRATORY:  No cough, shortness of breath  GASTROINTESTINAL:  No nausea, vomiting or diarrhea.  GENITOURINARY:  No dysuria, frequency or urgency. No Blood in urine  MUSCULOSKELETAL:  no joint aches, no muscle pain  SKIN:  No change in skin, hair or nails.  NEUROLOGIC:  No Headaches, seizures or weakness.  PSYCHIATRIC:  No disorder of thought or mood.  ENDOCRINE:  No heat or cold intolerance  HEMATOLOGICAL:  No easy bruising or bleeding.    =======================================================  Allergies    No Known Allergies    Intolerances    Antibiotics:  ceFAZolin   IVPB 2000 milliGRAM(s) IV Intermittent every 8 hours    Other medications:  mupirocin 2% Ointment 1 Application(s) Topical <User Schedule>    ======================================================  Physical Exam:  ============  T(F): 98 (27 Jul 2022 04:10), Max: 98.1 (26 Jul 2022 16:21)  HR: 72 (27 Jul 2022 04:10)  BP: 117/68 (27 Jul 2022 04:10)  RR: 18 (27 Jul 2022 04:10)  SpO2: 97% (27 Jul 2022 04:10) (97% - 98%)  temp max in last 48H T(F): , Max: 99 (07-25-22 @ 16:23)    General:  No acute distress.  Eye: Pupils are equal, round and reactive to light, Extraocular movements are intact, Normal conjunctiva.  HENT: Normocephalic, Oral mucosa is moist, No pharyngeal erythema, No sinus tenderness.  Neck: Supple, No lymphadenopathy.  Respiratory: Lungs are clear to auscultation, Respirations are non-labored.  Cardiovascular: Normal rate, Regular rhythm,  s1+s2  Gastrointestinal: Soft, Non-tender, Non-distended, Normal bowel sounds.  Genitourinary: No costovertebral angle tenderness.  Lymphatics: No lymphadenopathy neck,   Musculoskeletal: Normal range of motion, Normal strength.  left hand +swelling wound on palm at base of thumb +tender no drainage, palmar wounds healing, limited ROM  Integumentary: No rash.  Neurologic: Alert, Oriented, No focal deficits, Cranial Nerves II-XII are grossly intact.  Psychiatric: Appropriate mood & affect.  =======================================================  Labs:            Culture - Abscess with Gram Stain (collected 07-21-22 @ 11:40)  Source: .Abscess L Thumb Infection  Final Report (07-26-22 @ 09:31):    Numerous Staphylococcus aureus  Organism: Staphylococcus aureus (07-26-22 @ 09:31)  Organism: Staphylococcus aureus (07-26-22 @ 09:31)    Sensitivities:      -  Ampicillin/Sulbactam: S <=8/4      -  Cefazolin: S <=4      -  Clindamycin: S <=0.25      -  Erythromycin: S <=0.25      -  Gentamicin: S <=1 Should not be used as monotherapy      -  Oxacillin: S 0.5 Oxacillin predicts susceptibility for dicloxacillin, methicillin, and nafcillin      -  Penicillin: R >8      -  Rifampin: S <=1 Should not be used as monotherapy      -  Tetra/Doxy: S <=1      -  Trimethoprim/Sulfamethoxazole: S <=0.5/9.5      -  Vancomycin: S 2      Method Type: TYRONE    Culture - Blood (collected 07-19-22 @ 14:40)  Source: .Blood Blood  Final Report (07-24-22 @ 19:01):    No Growth Final    Culture - Abscess with Gram Stain (collected 07-19-22 @ 13:00)  Source: .Abscess Arm - Left  Final Report (07-24-22 @ 17:48):    Numerous Staphylococcus aureus  Organism: Staphylococcus aureus (07-24-22 @ 17:48)  Organism: Staphylococcus aureus (07-24-22 @ 17:48)    Sensitivities:      -  Ampicillin/Sulbactam: S <=8/4      -  Cefazolin: S <=4      -  Clindamycin: S <=0.25      -  Erythromycin: S <=0.25      -  Gentamicin: S <=1 Should not be used as monotherapy      -  Oxacillin: S 0.5 Oxacillin predicts susceptibility for dicloxacillin, methicillin, and nafcillin      -  Penicillin: R >8      -  Rifampin: S <=1 Should not be used as monotherapy      -  Tetra/Doxy: S <=1      -  Trimethoprim/Sulfamethoxazole: S <=0.5/9.5      -  Vancomycin: S 2      Method Type: TYRONE

## 2022-07-26 NOTE — DIETITIAN INITIAL EVALUATION ADULT - PERTINENT MEDS FT
MEDICATIONS  (STANDING):  ceFAZolin   IVPB 2000 milliGRAM(s) IV Intermittent every 8 hours  mupirocin 2% Ointment 1 Application(s) Topical <User Schedule>    MEDICATIONS  (PRN):  fentaNYL    Injectable 25 MICROGram(s) IV Push every 5 minutes PRN Moderate Pain (4 - 6)  HYDROmorphone  Injectable 0.5 milliGRAM(s) IV Push every 10 minutes PRN Severe Pain (7 - 10)  ibuprofen  Tablet. 400 milliGRAM(s) Oral three times a day PRN Temp greater or equal to 38C (100.4F), Moderate Pain (4 - 6)  ondansetron Injectable 4 milliGRAM(s) IV Push once PRN Nausea and/or Vomiting  oxyCODONE    IR 10 milliGRAM(s) Oral every 3 hours PRN Moderate Pain (4 - 6)  oxyCODONE    IR 5 milliGRAM(s) Oral every 3 hours PRN Mild Pain (1 - 3)

## 2022-07-26 NOTE — DISCHARGE NOTE PROVIDER - NSDCMRMEDTOKEN_GEN_ALL_CORE_FT
ceFAZolin 2 g injection: 2 gram(s) intravenously every 8 hours  last dose 8/18/2022   ceFAZolin 2 g/50 mL-NaCl 0.9% intravenous solution:  2 gram(s) intravenous IVPB every 8 hours via picc through 8/18/22 weekly CBC CMP ESR CRP fax [phone] MDD:2g q8h   ceFAZolin 2 g injection: 2 gram(s) intravenously every 8 hours  last dose 8/18/2022    ceFAZolin 2 g injection: 2 gram(s) intravenously every 8 hours   last day 9/1

## 2022-07-26 NOTE — DIETITIAN INITIAL EVALUATION ADULT - ORAL INTAKE PTA/DIET HISTORY
Rosie (#[id number]) used for interview. Pt reported good po intake PTA and currently. Consuming breakfast during assessment. Reported  lbs, lost wt contributed to strenuous physical work. Stated ht 5'7". Encouraged HBV protein to aid in healing.

## 2022-07-26 NOTE — DISCHARGE NOTE PROVIDER - HOSPITAL COURSE
26M without significant medical history who presented to [facility] after injury to his hand with resulting swelling and pain. He was transferred to [facility] for further evaluation for concern of tenosynovitis. The patient underwent bedside incision and drainage but had worsened symptoms and underwent a second procedure in the operating room. Patient had mri hand and midline placed.     Cellulitis / Abscess - Wound culture grew MSSA. Blood culture was without growth. Analgesic medications as needed. On cefazolin until 8/18   Orthopedic Surgery follow up noted,   mri hand appreciated  -midline placed 26M without significant medical history who presented to [facility] after injury to his hand with resulting swelling and pain. He was transferred to [facility] for further evaluation for concern of tenosynovitis. The patient underwent bedside incision and drainage but had worsened symptoms and underwent a second procedure in the operating room. Patient had mri hand and midline placed.     Cellulitis / Abscess - Wound culture grew MSSA. Blood culture was without growth. Analgesic medications as needed. On cefazolin until 9/1 as per ID   Orthopedic Surgery follow up noted,   mri hand appreciated  -midline placed      mri hand -Impression:    Soft tissue ulcer along the volar aspect of the first digit. Findings   concerning for osteomyelitis of the first distal phalanx and first   proximal phalanx, as well as septic arthritis of the first   interphalangeal joint.    Mild tenosynovitis of the first digit flexor mechanism and mild   peritendinitis of the first digit extensor mechanism, which may be   reactive or infectious.    Edema surrounding all the tendons within the carpal tunnel, which may be   postoperative or be related to tenosynovitis. Mild enlargement of the   median nerve at the level of the carpal tunnel, which may represent   median nerve neuropathy.    Mild tenosynovitis of the second through fifth flexor mechanisms.    Edema within the thenar musculature of the hand, which may be   postoperative or be infectious.    Chronic appearing mildly displaced fracture of the hamate.

## 2022-07-26 NOTE — DISCHARGE NOTE PROVIDER - ATTENDING DISCHARGE PHYSICAL EXAMINATION:
General appearance: No acute distress, Awake, Alert  HEENT: Normocephalic, Atraumatic, Conjunctiva clear, EOMI  Neck: Supple, No JVD, No tenderness  Lungs: Breath sound equal bilaterally, No wheezes, No rales  Cardiovascular: S1S2, Regular rhythm  Abdomen: Soft, Nontender, Nondistended, No guarding/rebound, Positive bowel sounds  Neuro: Strength equal bilaterally, No tremors  Psychiatric: Appropriate mood, Normal affect

## 2022-07-26 NOTE — PROGRESS NOTE ADULT - SUBJECTIVE AND OBJECTIVE BOX
TRAY BLANKENSHIP    [id number]    26y      Male    INTERVAL HPI/OVERNIGHT EVENTS: patient being seen for tenosynovitis and OM. Patient seen at bedside and is in nad    patient had mri hand today.       REVIEW OF SYSTEMS:    CONSTITUTIONAL: No fever, weight loss, or fatigue  RESPIRATORY: No cough, wheezing, hemoptysis; No shortness of breath  CARDIOVASCULAR: No chest pain, palpitations  GASTROINTESTINAL: No abdominal or epigastric pain. No nausea, vomiting  NEUROLOGICAL: No headaches, memory loss, loss of strength.  MISCELLANEOUS:      Vital Signs Last 24 Hrs  T(C): 36.4 (26 Jul 2022 09:17), Max: 37.2 (25 Jul 2022 16:23)  T(F): 97.5 (26 Jul 2022 09:17), Max: 99 (25 Jul 2022 16:23)  HR: 99 (26 Jul 2022 09:17) (65 - 99)  BP: 128/93 (26 Jul 2022 09:17) (128/69 - 135/76)  BP(mean): --  RR: 18 (26 Jul 2022 09:17) (17 - 19)  SpO2: 98% (26 Jul 2022 09:17) (96% - 99%)    Parameters below as of 26 Jul 2022 09:17  Patient On (Oxygen Delivery Method): room air        PHYSICAL EXAM:    General appearance: No acute distress, Awake, Alert  HEENT: Normocephalic, Atraumatic, Conjunctiva clear, EOMI  Neck: Supple, No JVD, No tenderness  Lungs: Breath sound equal bilaterally, No wheezes, No rales  Cardiovascular: S1S2, Regular rhythm  Abdomen: Soft, Nontender, Nondistended, No guarding/rebound, Positive bowel sounds  Neuro: Strength equal bilaterally, No tremors  Psychiatric: Appropriate mood, Normal affect        LABS:      MEDICATIONS  (STANDING):  ceFAZolin   IVPB 2000 milliGRAM(s) IV Intermittent every 8 hours  mupirocin 2% Ointment 1 Application(s) Topical <User Schedule>    MEDICATIONS  (PRN):  fentaNYL    Injectable 25 MICROGram(s) IV Push every 5 minutes PRN Moderate Pain (4 - 6)  HYDROmorphone  Injectable 0.5 milliGRAM(s) IV Push every 10 minutes PRN Severe Pain (7 - 10)  ibuprofen  Tablet. 400 milliGRAM(s) Oral three times a day PRN Temp greater or equal to 38C (100.4F), Moderate Pain (4 - 6)  ondansetron Injectable 4 milliGRAM(s) IV Push once PRN Nausea and/or Vomiting  oxyCODONE    IR 10 milliGRAM(s) Oral every 3 hours PRN Moderate Pain (4 - 6)  oxyCODONE    IR 5 milliGRAM(s) Oral every 3 hours PRN Mild Pain (1 - 3)      RADIOLOGY & ADDITIONAL TESTS:  mri hand appreciated

## 2022-07-26 NOTE — PROGRESS NOTE ADULT - SUBJECTIVE AND OBJECTIVE BOX
Patient was seen and examined at approximately    ORTHO-TRAUMA SERVICE      Pt Name: TRAY BLANKENSHIP    MRN: [id number]    History:  The patient is status post I&D left hand with carpal tunnel release, POD#6. He is doing well. C/O pain at wound site by first digit. Pain with touch and movement of thumb. No worsening of symptoms, some improvement per pt. +numbness in thumb. No changes in motor or sensory. No new complaints.      PAST MEDICAL & SURGICAL HISTORY:  PAST MEDICAL & SURGICAL HISTORY:      Allergies: No Known Allergies      Medications: ceFAZolin   IVPB 2000 milliGRAM(s) IV Intermittent every 8 hours  fentaNYL    Injectable 25 MICROGram(s) IV Push every 5 minutes PRN  HYDROmorphone  Injectable 0.5 milliGRAM(s) IV Push every 10 minutes PRN  ibuprofen  Tablet. 400 milliGRAM(s) Oral three times a day PRN  mupirocin 2% Ointment 1 Application(s) Topical <User Schedule>  ondansetron Injectable 4 milliGRAM(s) IV Push once PRN  oxyCODONE    IR 10 milliGRAM(s) Oral every 3 hours PRN  oxyCODONE    IR 5 milliGRAM(s) Oral every 3 hours PRN        PHYSICAL EXAM:    Vital Signs Last 24 Hrs  T(C): 36.4 (26 Jul 2022 09:17), Max: 37.2 (25 Jul 2022 16:23)  T(F): 97.5 (26 Jul 2022 09:17), Max: 99 (25 Jul 2022 16:23)  HR: 99 (26 Jul 2022 09:17) (65 - 99)  BP: 128/93 (26 Jul 2022 09:17) (128/69 - 135/76)  BP(mean): --  RR: 18 (26 Jul 2022 09:17) (17 - 19)  SpO2: 98% (26 Jul 2022 09:17) (96% - 99%)    Parameters below as of 26 Jul 2022 09:17  Patient On (Oxygen Delivery Method): room air      Daily     Daily     Appearance: Alert, responsive, in no acute distress.    Neurological: Sensation is grossly intact to light touch. focal deficits over Left 1st digit loss of sensation to light touch    Skin: no rash on visible skin. Skin is clean, dry and intact. No bleeding. No abrasions. No ulcerations.    Vascular: 2+ radial pulses. Cap refill < 2 sec. No cyanosis.    Musculoskeletal:         Left hand: Dressing has some mild dried bloody drainage localized to 1st digit. +wound 1st digit volar side with granulation tissue prox phalanx and MCP. Palmar and thumb incisional wounds without active drainage bleeding or purulent drainage noted. Incisions healing well volar wrist and hypothenar eminence region. Intrinsics/PIN intact AIN diminished from swelling and pain. +swelling, improving. +TTP 1st digit. Sensation intact digits 2-5. +ROM digits 2-5 but limited due to swelling and pain. Decreased sensation distally at 1st digit. Limited ROM 1st digit IP/MCP. +ecchymosis. No drainage noted. Small wound dorsal, no drainage. palmar wounds healing well. No dc. No erythema.         A/P: 25 yo M s/p left hand I&D with carpal tunnel release POD#6    PLAN:   -D/w Pinsky  - wounds soaked in betadine/NS  - bactroban applied to open wounds on 1st digit  - dressing applied with 4x4 and cling  - continue daily soaks and bactroban dsg changes  - IV abx per ID  - pain control  - elevate left hand  - midline placed  -NWB LUE  - continue medical management

## 2022-07-27 LAB
ALBUMIN SERPL ELPH-MCNC: 4.2 G/DL — SIGNIFICANT CHANGE UP (ref 3.3–5.2)
ALP SERPL-CCNC: 102 U/L — SIGNIFICANT CHANGE UP (ref 40–120)
ALT FLD-CCNC: 41 U/L — HIGH
ANION GAP SERPL CALC-SCNC: 11 MMOL/L — SIGNIFICANT CHANGE UP (ref 5–17)
AST SERPL-CCNC: 23 U/L — SIGNIFICANT CHANGE UP
BILIRUB SERPL-MCNC: 0.3 MG/DL — LOW (ref 0.4–2)
BUN SERPL-MCNC: 13.8 MG/DL — SIGNIFICANT CHANGE UP (ref 8–20)
CALCIUM SERPL-MCNC: 9.2 MG/DL — SIGNIFICANT CHANGE UP (ref 8.4–10.5)
CHLORIDE SERPL-SCNC: 102 MMOL/L — SIGNIFICANT CHANGE UP (ref 98–107)
CO2 SERPL-SCNC: 24 MMOL/L — SIGNIFICANT CHANGE UP (ref 22–29)
CREAT SERPL-MCNC: 0.73 MG/DL — SIGNIFICANT CHANGE UP (ref 0.5–1.3)
EGFR: 130 ML/MIN/1.73M2 — SIGNIFICANT CHANGE UP
GLUCOSE SERPL-MCNC: 88 MG/DL — SIGNIFICANT CHANGE UP (ref 70–99)
MAGNESIUM SERPL-MCNC: 2.1 MG/DL — SIGNIFICANT CHANGE UP (ref 1.6–2.6)
POTASSIUM SERPL-MCNC: 3.8 MMOL/L — SIGNIFICANT CHANGE UP (ref 3.5–5.3)
POTASSIUM SERPL-SCNC: 3.8 MMOL/L — SIGNIFICANT CHANGE UP (ref 3.5–5.3)
PROT SERPL-MCNC: 7.6 G/DL — SIGNIFICANT CHANGE UP (ref 6.6–8.7)
SARS-COV-2 RNA SPEC QL NAA+PROBE: SIGNIFICANT CHANGE UP
SODIUM SERPL-SCNC: 137 MMOL/L — SIGNIFICANT CHANGE UP (ref 135–145)

## 2022-07-27 PROCEDURE — 99232 SBSQ HOSP IP/OBS MODERATE 35: CPT

## 2022-07-27 RX ADMIN — Medication 100 MILLIGRAM(S): at 21:20

## 2022-07-27 RX ADMIN — Medication 100 MILLIGRAM(S): at 14:15

## 2022-07-27 RX ADMIN — Medication 400 MILLIGRAM(S): at 22:50

## 2022-07-27 RX ADMIN — OXYCODONE HYDROCHLORIDE 10 MILLIGRAM(S): 5 TABLET ORAL at 14:16

## 2022-07-27 RX ADMIN — MUPIROCIN 1 APPLICATION(S): 20 OINTMENT TOPICAL at 09:44

## 2022-07-27 RX ADMIN — Medication 100 MILLIGRAM(S): at 05:12

## 2022-07-27 NOTE — PROGRESS NOTE ADULT - ASSESSMENT
26 year old male with no known significant medical history is sent from [facility] for a hand tenosynovitis and possible deeper tissue abscess collection in the left hand. As per the patient he put his hand in a glove and felt something sharp and had about a one cm cut and over the past two days the area got red and swollen and painful and now he has the thumb swollen tender red hand. Accompanied with fever and nausea no chills. s/p bedside I+D but developed worsening erythema up forearm.    Left hand cellulitis/abscess/tenosynovitis s/p I+D left hand carpal tunnel release  Leukocytosis    - BCX ngtd  - wound CX MS staph aureus  - s/p I+D left hand carpal tunnel release  - f/u Or cx MSSA  - MR hand as above 1st distal/prox phalanx OM +septic arthritis of 1st pip, per ortho no further intervention  - PICC  - given mri findings IV abx course extended- cefazolin 2 g IV q8h through 9/1/22  for 6 weeks, weekly CBC CMp ESR CRp, script provided to CM  - pt reports getting tetanus vaccine 3-4 months ago  - ortho f/u  - Trend Fever  - Trend Leukocytosis-improved  - zofran/ppi as needed    ID f/u 2 weeks    signing off  d/w CM, ortho,pt

## 2022-07-27 NOTE — PROGRESS NOTE ADULT - SUBJECTIVE AND OBJECTIVE BOX
TRAY BLANKENSHIP    [id number]    History:  The patient is being followed for Left thumb abscess with hand cellulitis.  Patient underwent a left carpal tunnel release with Left hand Incision and drainage on 7/20, POD#7.  The patient reports improved condition since last examination. Denies nausea, vomiting, chest pain, shortness of breath, abdominal pain or fever. No new complaints. No acute motor or sensory changes are reported.    Vital Signs Last 24 Hrs  T(C): 36.6 (27 Jul 2022 09:45), Max: 36.7 (26 Jul 2022 16:21)  T(F): 97.9 (27 Jul 2022 09:45), Max: 98.1 (26 Jul 2022 16:21)  HR: 84 (27 Jul 2022 09:45) (72 - 84)  BP: 136/74 (27 Jul 2022 09:45) (117/68 - 136/74)  BP(mean): --  RR: 18 (27 Jul 2022 09:45) (18 - 18)  SpO2: 98% (27 Jul 2022 09:45) (97% - 98%)    Parameters below as of 27 Jul 2022 09:45  Patient On (Oxygen Delivery Method): room air          07-27    137  |  102  |  13.8  ----------------------------<  88  3.8   |  24.0  |  0.73    Ca    9.2      27 Jul 2022 06:09  Mg     2.1     07-27    TPro  7.6  /  Alb  4.2  /  TBili  0.3<L>  /  DBili  x   /  AST  23  /  ALT  41<H>  /  AlkPhos  102  07-27      MEDICATIONS  (STANDING):  ceFAZolin   IVPB 2000 milliGRAM(s) IV Intermittent every 8 hours  mupirocin 2% Ointment 1 Application(s) Topical <User Schedule>    MEDICATIONS  (PRN):  ibuprofen  Tablet. 400 milliGRAM(s) Oral three times a day PRN Temp greater or equal to 38C (100.4F), Moderate Pain (4 - 6)  ondansetron Injectable 4 milliGRAM(s) IV Push once PRN Nausea and/or Vomiting  oxyCODONE    IR 5 milliGRAM(s) Oral every 3 hours PRN Mild Pain (1 - 3)  oxyCODONE    IR 10 milliGRAM(s) Oral every 3 hours PRN Moderate Pain (4 - 6)      Physical exam: There is NO erythema of the affected area. This has improved. There is mild tenderness of the affected area, 1st digit, thenar aspect and wrist.   Dressings and packing removed. Betadine soak performed.   New packing placed to incision at thumb. No fluctuance, drainage or discharge noted.  Unable to express any purulent draining . No proximal streaking or spreading is noted. Compartments are soft. Sensation to light touch is grossly intact without focal deficit and is symmetric bilaterally.  Sutures remain intact at carpal tunnel region.  Motion is mildly limited as a result of pain. 2+ radial pulse. Capillary refill is less than 2 seconds. No cyanosis..      Primary Orthopedic Assessment:  • hand cellulitis, thumb abscess    Secondary  Orthopedic Assessment(s):   •     Secondary  Medical Assessment(s):   •     Plan:   • DVT prophylaxis as prescribed  • Continue IV antibiotics   • Elevation of the affected extremity  • Pain control as clinically indicated  • Discharge planning – anticipated discharge is Home

## 2022-07-27 NOTE — PROGRESS NOTE ADULT - ASSESSMENT
26M without significant medical history who presented to [facility] after injury to his hand with resulting swelling and pain. He was transferred to [facility] for further evaluation for concern of tenosynovitis. The patient underwent bedside incision and drainage but had worsened symptoms and underwent a second procedure in the operating room. Patient had midleine placed     Cellulitis / Abscess - Wound culture grew MSSA. Blood culture was without growth. Analgesic medications as needed. On cefazolin.    Orthopedic Surgery follow up noted,   mri hand appreciated  - ID following  -midline placed    spoke to  - teddy mendez tomorrow

## 2022-07-27 NOTE — PROGRESS NOTE ADULT - SUBJECTIVE AND OBJECTIVE BOX
[facility]                                                INFECTIOUS DISEASES  =======================================================                               Jose Acosta MD#  Delano Mancia MD*                                     Dieudonne Taylor MD*    Kate Singh MD*            Diplomates American Board of Internal Medicine & Infectious Diseases                  # [place] Office - Appt - Tel  [phone] Fax [phone]                * [place] Office - Appt - Tel [phone] Fax [phone]                                  Hospital Consult line:  [phone]  =======================================================      [id number]  TRAY BLANKENSHIP   follow up for: left hand infection  [id number]   pt feels well denies complaints, asking when he will go home  patient seen and examined.       I have personally reviewed the labs and data; pertinent labs and data are listed in this note; please see below.   ===================================================  REVIEW OF SYSTEMS:  CONSTITUTIONAL:  No Fever or chills  HEENT:  No diplopia or blurred vision.  No earache, sore throat or runny nose.  CARDIOVASCULAR:  No pressure, squeezing, strangling, tightness, heaviness or aching about the chest, neck, axilla or epigastrium.  RESPIRATORY:  No cough, shortness of breath  GASTROINTESTINAL:  No nausea, vomiting or diarrhea.  GENITOURINARY:  No dysuria, frequency or urgency. No Blood in urine  MUSCULOSKELETAL:  no joint aches, no muscle pain  SKIN:  No change in skin, hair or nails.  NEUROLOGIC:  No Headaches, seizures or weakness.  PSYCHIATRIC:  No disorder of thought or mood.  ENDOCRINE:  No heat or cold intolerance  HEMATOLOGICAL:  No easy bruising or bleeding.    =======================================================  Allergies    No Known Allergies    Intolerances    Antibiotics:  ceFAZolin   IVPB 2000 milliGRAM(s) IV Intermittent every 8 hours    Other medications:  mupirocin 2% Ointment 1 Application(s) Topical <User Schedule>    ======================================================  Physical Exam:  ============  T(F): 97.9 (27 Jul 2022 09:45), Max: 98.1 (26 Jul 2022 16:21)  HR: 84 (27 Jul 2022 09:45)  BP: 136/74 (27 Jul 2022 09:45)  RR: 18 (27 Jul 2022 09:45)  SpO2: 98% (27 Jul 2022 09:45) (97% - 98%)  temp max in last 48H T(F): , Max: 99 (07-25-22 @ 16:23)    General:  No acute distress.  Eye: Pupils are equal, round and reactive to light, Extraocular movements are intact, Normal conjunctiva.  HENT: Normocephalic, Oral mucosa is moist, No pharyngeal erythema, No sinus tenderness.  Neck: Supple, No lymphadenopathy.  Respiratory: Lungs are clear to auscultation, Respirations are non-labored.  Cardiovascular: Normal rate, Regular rhythm,  s1+s2  Gastrointestinal: Soft, Non-tender, Non-distended, Normal bowel sounds.  Genitourinary: No costovertebral angle tenderness.  Lymphatics: No lymphadenopathy neck,   Musculoskeletal: Normal range of motion, Normal strength. left hand dressing intact  Integumentary: No rash.  Neurologic: Alert, Oriented, No focal deficits, Cranial Nerves II-XII are grossly intact.  Psychiatric: Appropriate mood & affect.  =======================================================  Labs:    07-27    137  |  102  |  13.8  ----------------------------<  88  3.8   |  24.0  |  0.73    Ca    9.2      27 Jul 2022 06:09  Mg     2.1     07-27    TPro  7.6  /  Alb  4.2  /  TBili  0.3<L>  /  DBili  x   /  AST  23  /  ALT  41<H>  /  AlkPhos  102  07-27      Culture - Abscess with Gram Stain (collected 07-21-22 @ 11:40)  Source: .Abscess L Thumb Infection  Final Report (07-26-22 @ 09:31):    Numerous Staphylococcus aureus  Organism: Staphylococcus aureus (07-26-22 @ 09:31)  Organism: Staphylococcus aureus (07-26-22 @ 09:31)    Sensitivities:      -  Ampicillin/Sulbactam: S <=8/4      -  Cefazolin: S <=4      -  Clindamycin: S <=0.25      -  Erythromycin: S <=0.25      -  Gentamicin: S <=1 Should not be used as monotherapy      -  Oxacillin: S 0.5 Oxacillin predicts susceptibility for dicloxacillin, methicillin, and nafcillin      -  Penicillin: R >8      -  Rifampin: S <=1 Should not be used as monotherapy      -  Tetra/Doxy: S <=1      -  Trimethoprim/Sulfamethoxazole: S <=0.5/9.5      -  Vancomycin: S 2      Method Type: TYRONE    Culture - Blood (collected 07-19-22 @ 14:40)  Source: .Blood Blood  Final Report (07-24-22 @ 19:01):    No Growth Final    Culture - Abscess with Gram Stain (collected 07-19-22 @ 13:00)  Source: .Abscess Arm - Left  Final Report (07-24-22 @ 17:48):    Numerous Staphylococcus aureus  Organism: Staphylococcus aureus (07-24-22 @ 17:48)  Organism: Staphylococcus aureus (07-24-22 @ 17:48)    Sensitivities:      -  Ampicillin/Sulbactam: S <=8/4      -  Cefazolin: S <=4      -  Clindamycin: S <=0.25      -  Erythromycin: S <=0.25      -  Gentamicin: S <=1 Should not be used as monotherapy      -  Oxacillin: S 0.5 Oxacillin predicts susceptibility for dicloxacillin, methicillin, and nafcillin      -  Penicillin: R >8      -  Rifampin: S <=1 Should not be used as monotherapy      -  Tetra/Doxy: S <=1      -  Trimethoprim/Sulfamethoxazole: S <=0.5/9.5      -  Vancomycin: S 2      Method Type: TYRONE      < from: MR Hand No Cont, Left (07.26.22 @ 08:58) >  Findings:    Please note that the second through fifth digits are not well evaluated   due to patient positioning.    There is suggestion of a soft tissue ulcer along the volar aspect of the   first digit. There is osseous edema with subtle areas of T1 marrow signal   abnormality within the first distal phalanx and first proximal phalanx,   concerning for osteomyelitis. The small joint effusion with synovitis of   the first interphalangeal joint, concerning for septic arthritis.    Chronic appearing mildly displaced fracture of the hamate.    Mild tenosynovitis of the first digit flexor mechanism and peritendinitis   of the first digit extensor mechanism, which may be reactive or   infectious.    There is edema with surrounding all the tendons within the carpal tunnel,   which may be postoperative or be related to tenosynovitis. Mild   enlargement of the median nerve at the level of the carpal tunnel, which   may represent median nerve neuropathy.    Mild tenosynovitis of the second through fifth flexor mechanisms   extending from the carpal tunnel to the level of the proximal phalanges.    Edema within the thenar musculature of the hand.    Impression:    Soft tissue ulcer along the volar aspect of the first digit. Findings   concerning for osteomyelitis of the first distal phalanx and first   proximal phalanx, as well as septic arthritis of the first   interphalangeal joint.    Mild tenosynovitis ofthe first digit flexor mechanism and mild   peritendinitis of the first digit extensor mechanism, which may be   reactive or infectious.    Edema surrounding all the tendons within the carpal tunnel, which may be   postoperative or be related to tenosynovitis. Mild enlargement of the   median nerve at the level of the carpal tunnel, which may represent   median nerve neuropathy.    Mild tenosynovitis of the second through fifth flexor mechanisms.    Edema within the thenar musculature of the hand, which may be   postoperative or be infectious.      Chronic appearing mildly displaced fracture of the hamate.    < end of copied text >      < end of copied text >

## 2022-07-27 NOTE — PROGRESS NOTE ADULT - SUBJECTIVE AND OBJECTIVE BOX
TRAY BLANKENSHIP    [id number]    26y      Male    INTERVAL HPI/OVERNIGHT EVENTS: patient being seen for tenosynovitis and OM. Patient seen at bedside and denies any complaints    REVIEW OF SYSTEMS:    CONSTITUTIONAL: No fever, weight loss, or fatigue  RESPIRATORY: No cough, wheezing, hemoptysis; No shortness of breath  CARDIOVASCULAR: No chest pain, palpitations  GASTROINTESTINAL: No abdominal or epigastric pain. No nausea, vomiting  NEUROLOGICAL: No headaches, memory loss, loss of strength.  MISCELLANEOUS:      Vital Signs Last 24 Hrs  T(C): 36.6 (27 Jul 2022 09:45), Max: 36.7 (26 Jul 2022 16:21)  T(F): 97.9 (27 Jul 2022 09:45), Max: 98.1 (26 Jul 2022 16:21)  HR: 84 (27 Jul 2022 09:45) (72 - 84)  BP: 136/74 (27 Jul 2022 09:45) (117/68 - 136/74)  BP(mean): --  RR: 18 (27 Jul 2022 09:45) (18 - 18)  SpO2: 98% (27 Jul 2022 09:45) (97% - 98%)    Parameters below as of 27 Jul 2022 09:45  Patient On (Oxygen Delivery Method): room air        PHYSICAL EXAM:  General appearance: No acute distress, Awake, Alert  HEENT: Normocephalic, Atraumatic, Conjunctiva clear, EOMI  Neck: Supple, No JVD, No tenderness  Lungs: Breath sound equal bilaterally, No wheezes, No rales  Cardiovascular: S1S2, Regular rhythm  Abdomen: Soft, Nontender, Nondistended, No guarding/rebound, Positive bowel sounds  Neuro: Strength equal bilaterally, No tremors  Psychiatric: Appropriate mood, Normal affect        LABS:    07-27    137  |  102  |  13.8  ----------------------------<  88  3.8   |  24.0  |  0.73    Ca    9.2      27 Jul 2022 06:09  Mg     2.1     07-27    TPro  7.6  /  Alb  4.2  /  TBili  0.3<L>  /  DBili  x   /  AST  23  /  ALT  41<H>  /  AlkPhos  102  07-27            MEDICATIONS  (STANDING):  ceFAZolin   IVPB 2000 milliGRAM(s) IV Intermittent every 8 hours  mupirocin 2% Ointment 1 Application(s) Topical <User Schedule>    MEDICATIONS  (PRN):  ibuprofen  Tablet. 400 milliGRAM(s) Oral three times a day PRN Temp greater or equal to 38C (100.4F), Moderate Pain (4 - 6)  ondansetron Injectable 4 milliGRAM(s) IV Push once PRN Nausea and/or Vomiting  oxyCODONE    IR 5 milliGRAM(s) Oral every 3 hours PRN Mild Pain (1 - 3)  oxyCODONE    IR 10 milliGRAM(s) Oral every 3 hours PRN Moderate Pain (4 - 6)      RADIOLOGY & ADDITIONAL TESTS:

## 2022-07-28 ENCOUNTER — TRANSCRIPTION ENCOUNTER (OUTPATIENT)
Age: 27
End: 2022-07-28

## 2022-07-28 VITALS
DIASTOLIC BLOOD PRESSURE: 83 MMHG | SYSTOLIC BLOOD PRESSURE: 133 MMHG | RESPIRATION RATE: 18 BRPM | TEMPERATURE: 98 F | OXYGEN SATURATION: 99 % | HEART RATE: 94 BPM

## 2022-07-28 PROCEDURE — 99239 HOSP IP/OBS DSCHRG MGMT >30: CPT

## 2022-07-28 RX ADMIN — Medication 100 MILLIGRAM(S): at 13:01

## 2022-07-28 RX ADMIN — MUPIROCIN 1 APPLICATION(S): 20 OINTMENT TOPICAL at 07:40

## 2022-07-28 RX ADMIN — Medication 100 MILLIGRAM(S): at 05:38

## 2022-07-28 NOTE — PROGRESS NOTE ADULT - ASSESSMENT
26M without significant medical history who presented to [facility] after injury to his hand with resulting swelling and pain. He was transferred to [facility] for further evaluation for concern of tenosynovitis. The patient underwent bedside incision and drainage but had worsened symptoms and underwent a second procedure in the operating room. Patient had midleine placed     Cellulitis / Abscess - Wound culture grew MSSA. Blood culture was without growth. Analgesic medications as needed. On cefazolin until 9/1   Orthopedic Surgery follow up noted,   mri hand appreciated  - ID following  -midline placed    spoke to  - dc when home abx set up

## 2022-07-28 NOTE — PROGRESS NOTE ADULT - SUBJECTIVE AND OBJECTIVE BOX
TRAY BLANKENSHIP    [id number]    26y      Male    INTERVAL HPI/OVERNIGHT EVENTS: patient being seen for hand om. Patient seen at bedside and denies any complaints    REVIEW OF SYSTEMS:    CONSTITUTIONAL: No fever, weight loss, or fatigue  RESPIRATORY: No cough, wheezing, hemoptysis; No shortness of breath  CARDIOVASCULAR: No chest pain, palpitations  GASTROINTESTINAL: No abdominal or epigastric pain. No nausea, vomiting  NEUROLOGICAL: No headaches, memory loss, loss of strength.  MISCELLANEOUS:      Vital Signs Last 24 Hrs  T(C): 36.7 (28 Jul 2022 04:20), Max: 36.7 (28 Jul 2022 04:20)  T(F): 98 (28 Jul 2022 04:20), Max: 98 (28 Jul 2022 04:20)  HR: 74 (28 Jul 2022 04:20) (74 - 77)  BP: 109/63 (28 Jul 2022 04:20) (109/63 - 110/64)  BP(mean): --  RR: 18 (28 Jul 2022 04:20) (18 - 18)  SpO2: 99% (28 Jul 2022 04:20) (99% - 100%)    Parameters below as of 28 Jul 2022 04:20  Patient On (Oxygen Delivery Method): room air        PHYSICAL EXAM:    General appearance: No acute distress, Awake, Alert  HEENT: Normocephalic, Atraumatic, Conjunctiva clear, EOMI  Neck: Supple, No JVD, No tenderness  Lungs: Breath sound equal bilaterally, No wheezes, No rales  Cardiovascular: S1S2, Regular rhythm  Abdomen: Soft, Nontender, Nondistended, No guarding/rebound, Positive bowel sounds  EXT: right arm picc  Neuro: Strength equal bilaterally, No tremors  Psychiatric: Appropriate mood, Normal affect        LABS:    07-27    137  |  102  |  13.8  ----------------------------<  88  3.8   |  24.0  |  0.73    Ca    9.2      27 Jul 2022 06:09  Mg     2.1     07-27    TPro  7.6  /  Alb  4.2  /  TBili  0.3<L>  /  DBili  x   /  AST  23  /  ALT  41<H>  /  AlkPhos  102  07-27            MEDICATIONS  (STANDING):  ceFAZolin   IVPB 2000 milliGRAM(s) IV Intermittent every 8 hours  mupirocin 2% Ointment 1 Application(s) Topical <User Schedule>    MEDICATIONS  (PRN):  ibuprofen  Tablet. 400 milliGRAM(s) Oral three times a day PRN Temp greater or equal to 38C (100.4F), Moderate Pain (4 - 6)  ondansetron Injectable 4 milliGRAM(s) IV Push once PRN Nausea and/or Vomiting  oxyCODONE    IR 5 milliGRAM(s) Oral every 3 hours PRN Mild Pain (1 - 3)  oxyCODONE    IR 10 milliGRAM(s) Oral every 3 hours PRN Moderate Pain (4 - 6)      RADIOLOGY & ADDITIONAL TESTS:

## 2022-07-28 NOTE — DISCHARGE NOTE NURSING/CASE MANAGEMENT/SOCIAL WORK - NSDCPEFALRISK_GEN_ALL_CORE
For information on Fall & Injury Prevention, visit: [email or web address] OR  [email or web address] OR  https://www.cdc.gov/steadi/patient.html

## 2022-07-28 NOTE — CHART NOTE - NSCHARTNOTEFT_GEN_A_CORE
To whom it may concern.            Please excuse the absence of Mr Ruiz from his employment. He was under our care from 7/19--> present for a serious medical condition. He will need further bed and is cleared to return to work when his hand surgeon allows him to do so. Thank you for your understanding in this matter. Please call dr himanshu loyd with any questions [phone]        Dr Himanshu Loyd

## 2022-07-28 NOTE — PROGRESS NOTE ADULT - PROVIDER SPECIALTY LIST ADULT
Hospitalist
Infectious Disease
Internal Medicine
Orthopedics
Hospitalist
Hospitalist
Infectious Disease
Orthopedics
Infectious Disease
Internal Medicine
Orthopedics
Orthopedics
Plastic Surgery
Internal Medicine
Internal Medicine

## 2022-07-28 NOTE — PROGRESS NOTE ADULT - REASON FOR ADMISSION
tenosynovitis

## 2022-07-28 NOTE — PROGRESS NOTE ADULT - SUBJECTIVE AND OBJECTIVE BOX
Pt Name: TRAY BLANKENSHIP  MRN: [id number]      The patient is being followed for Left thumb abscess with hand cellulitis.  Patient underwent a left carpal tunnel release with Left hand Incision and drainage on 7/20, POD#8. Patient seen and examined. Denies nausea, vomiting, chest pain, shortness of breath, abdominal pain or fever. No new complaints. No acute motor or sensory changes are reported.      PAST MEDICAL & SURGICAL HISTORY:      Allergies: No Known Allergies      07-27    137  |  102  |  13.8  ----------------------------<  88  3.8   |  24.0  |  0.73    Ca    9.2      27 Jul 2022 06:09  Mg     2.1     07-27    TPro  7.6  /  Alb  4.2  /  TBili  0.3<L>  /  DBili  x   /  AST  23  /  ALT  41<H>  /  AlkPhos  102  07-27      PHYSICAL EXAM:    Vital Signs Last 24 Hrs  T(C): 36.7 (28 Jul 2022 04:20), Max: 36.7 (28 Jul 2022 04:20)  T(F): 98 (28 Jul 2022 04:20), Max: 98 (28 Jul 2022 04:20)  HR: 74 (28 Jul 2022 04:20) (74 - 77)  BP: 109/63 (28 Jul 2022 04:20) (109/63 - 110/64)  BP(mean): --  RR: 18 (28 Jul 2022 04:20) (18 - 18)  SpO2: 99% (28 Jul 2022 04:20) (99% - 100%)    Parameters below as of 28 Jul 2022 04:20  Patient On (Oxygen Delivery Method): room air        Daily     Appearance: Alert, responsive, in no acute distress.  Musculoskeletal:       Left Upper Extremity: No erythema noted of the affected area. There is mild tenderness of the affected area, 1st digit, thenar aspect and wrist. Dressings and packing removed. Betadine soak performed. New packing placed to incision at thumb. No fluctuance, drainage or discharge noted.  Unable to express any purulent draining . No proximal streaking or spreading is noted. Compartments are soft. Sensation to light touch is grossly intact without focal deficit and is symmetric bilaterally.  Sutures remain intact at carpal tunnel region.  Motion is mildly limited as a result of pain. 2+ radial pulse. Capillary refill is less than 2 seconds. No cyanosis..    A/P:  Pt is a  26y Male with left thumb abscess with hand cellulitis     Plan:   • DVT prophylaxis as prescribed  • Continue IV antibiotics   • Elevation of the affected extremity  • Pain control as clinically indicated  • Discharge planning – anticipated discharge is Home: insurance pending

## 2022-08-11 PROCEDURE — 36415 COLL VENOUS BLD VENIPUNCTURE: CPT

## 2022-08-11 PROCEDURE — U0003: CPT

## 2022-08-11 PROCEDURE — 87075 CULTR BACTERIA EXCEPT BLOOD: CPT

## 2022-08-11 PROCEDURE — 87205 SMEAR GRAM STAIN: CPT

## 2022-08-11 PROCEDURE — 87077 CULTURE AEROBIC IDENTIFY: CPT

## 2022-08-11 PROCEDURE — 85025 COMPLETE CBC W/AUTO DIFF WBC: CPT

## 2022-08-11 PROCEDURE — 82803 BLOOD GASES ANY COMBINATION: CPT

## 2022-08-11 PROCEDURE — 87186 SC STD MICRODIL/AGAR DIL: CPT

## 2022-08-11 PROCEDURE — 73110 X-RAY EXAM OF WRIST: CPT

## 2022-08-11 PROCEDURE — 85027 COMPLETE CBC AUTOMATED: CPT

## 2022-08-11 PROCEDURE — U0005: CPT

## 2022-08-11 PROCEDURE — 87070 CULTURE OTHR SPECIMN AEROBIC: CPT

## 2022-08-11 PROCEDURE — 82330 ASSAY OF CALCIUM: CPT

## 2022-08-11 PROCEDURE — 85014 HEMATOCRIT: CPT

## 2022-08-11 PROCEDURE — 85018 HEMOGLOBIN: CPT

## 2022-08-11 PROCEDURE — 73130 X-RAY EXAM OF HAND: CPT

## 2022-08-11 PROCEDURE — 84295 ASSAY OF SERUM SODIUM: CPT

## 2022-08-11 PROCEDURE — 87040 BLOOD CULTURE FOR BACTERIA: CPT

## 2022-08-11 PROCEDURE — 83605 ASSAY OF LACTIC ACID: CPT

## 2022-08-11 PROCEDURE — 80053 COMPREHEN METABOLIC PANEL: CPT

## 2022-08-11 PROCEDURE — 80048 BASIC METABOLIC PNL TOTAL CA: CPT

## 2022-08-11 PROCEDURE — 84132 ASSAY OF SERUM POTASSIUM: CPT

## 2022-08-11 PROCEDURE — 73218 MRI UPPER EXTREMITY W/O DYE: CPT | Mod: MA

## 2022-08-11 PROCEDURE — 82947 ASSAY GLUCOSE BLOOD QUANT: CPT

## 2022-08-11 PROCEDURE — 83735 ASSAY OF MAGNESIUM: CPT

## 2022-08-11 PROCEDURE — 82435 ASSAY OF BLOOD CHLORIDE: CPT

## 2022-08-16 PROBLEM — Z00.00 ENCOUNTER FOR PREVENTIVE HEALTH EXAMINATION: Status: ACTIVE | Noted: 2022-08-16

## 2022-08-29 ENCOUNTER — APPOINTMENT (OUTPATIENT)
Dept: INTERNAL MEDICINE | Facility: CLINIC | Age: 27
End: 2022-08-29

## 2022-08-29 VITALS
DIASTOLIC BLOOD PRESSURE: 74 MMHG | WEIGHT: 193 LBS | OXYGEN SATURATION: 97 % | HEIGHT: 68.9 IN | HEART RATE: 96 BPM | TEMPERATURE: 98.3 F | BODY MASS INDEX: 28.58 KG/M2 | SYSTOLIC BLOOD PRESSURE: 132 MMHG | RESPIRATION RATE: 15 BRPM

## 2022-08-29 PROCEDURE — 99072 ADDL SUPL MATRL&STAF TM PHE: CPT

## 2022-08-29 PROCEDURE — 99203 OFFICE O/P NEW LOW 30 MIN: CPT

## 2022-08-30 NOTE — PHYSICAL EXAM
[General Appearance - Alert] : alert [General Appearance - In No Acute Distress] : in no acute distress [Sclera] : the sclera and conjunctiva were normal [PERRL With Normal Accommodation] : pupils were equal in size, round, reactive to light [Extraocular Movements] : extraocular movements were intact [Neck Appearance] : the appearance of the neck was normal [Neck Cervical Mass (___cm)] : no neck mass was observed [Jugular Venous Distention Increased] : there was no jugular-venous distention [Thyroid Diffuse Enlargement] : the thyroid was not enlarged [Auscultation Breath Sounds / Voice Sounds] : lungs were clear to auscultation bilaterally [Heart Rate And Rhythm] : heart rate was normal and rhythm regular [Heart Sounds] : normal S1 and S2 [Heart Sounds Gallop] : no gallops [Murmurs] : no murmurs [Heart Sounds Pericardial Friction Rub] : no pericardial rub [Bowel Sounds] : normal bowel sounds [Abdomen Soft] : soft [Abdomen Tenderness] : non-tender [Abdomen Mass (___ Cm)] : no abdominal mass palpated [Costovertebral Angle Tenderness] : no CVA tenderness [Skin Color & Pigmentation] : normal skin color and pigmentation [] : no rash [Deep Tendon Reflexes (DTR)] : deep tendon reflexes were 2+ and symmetric [Sensation] : the sensory exam was normal to light touch and pinprick [Motor Exam] : the motor exam was normal [No Focal Deficits] : no focal deficits [FreeTextEntry1] : good ROM left hand

## 2022-08-30 NOTE — ASSESSMENT
[FreeTextEntry1] : \par Patient is here for f/u for MSSA Left hand cellulitis/abscess/tenosynovitis and Mr findings concerning for septic arthritis, osteomyelitis, s/p I+D left hand carpal tunnel release on 7/20/22 and is currently improving on IV antibiotics\par \par \par - given mri findings noted above he was planned for- cefazolin 2 g IV q8h through 9/1/22  for 6 weeks. Will continue. Weekly labs reviewed as above will continue to monitor\par - RN changed dressing in office, pt had mild contact dermatitis to tape at home. He will notify infusion company RN if any further issues\par - pt reports getting tetanus vaccine 3-4 months ago prior to hospital admission\par - has f/u with surgeon on 9/11/22, advised to come f/u with me after that visit\par \par all questions and concerns addressed using  and patient and mother verbalized understanding of above plan\par \par  [Treatment Education] : treatment education [Treatment Adherence] : treatment adherence [Rx Dose / Side Effects] : Rx dose/side effects [Risk Reduction] : risk reduction [Universal Precautions] : universal precautions [Anticipatory Guidance] : anticipatory guidance

## 2022-08-30 NOTE — HISTORY OF PRESENT ILLNESS
[FreeTextEntry1] : Admitted to [facility] 7/19/22-7/28/22\par \par \par \par 26 year old male with no known significant medical history [place] [facility] [facility] [place] for a hand tenosynovitis and possible deeper tissue abscess collection in the left hand. As per the patient he put his hand in a glove and felt something sharp and had about a one cm cut ,the area got red and swollen and painful. Accompanied with fever and nausea no chills. s/p bedside I+D but developed worsening erythema up forearm.\par \par Admitted for:\par Left hand cellulitis/abscess/tenosynovitis s/p I+D left hand carpal tunnel release on 7/20/22\par Leukocytosis\par \par - BCX ngtd\par - wound CX MS staph aureus\par - s/p I+D left hand carpal tunnel release 7/20/22\par - Or cx MSSA\par - MR hand  1st distal/prox phalanx OM +septic arthritis of 1st pip, per ortho no further intervention\par - PICC placed\par - given mri findings planned for- cefazolin 2 g IV q8h through 9/1/22  for 6 weeks\par - pt reports getting tetanus vaccine 3-4 months ago prior to hospital admission\par \par \par Interval f/u 8/29/22-\par \par patient here for follow up visit with his mom who translates for him. Also used [employer]  # [id number]\par saw surgery for f/i , has f/u on 9/11\par \par labs from 8/24 reviewed\par ESr 20, prior was 16, prior was 17\par AST is 52\par \par diarrhea after each dose\par c/o itching under tape and there is irritation just under tape only with last dressing change

## 2022-10-24 ENCOUNTER — APPOINTMENT (OUTPATIENT)
Dept: INTERNAL MEDICINE | Facility: CLINIC | Age: 27
End: 2022-10-24

## 2022-10-24 VITALS
HEIGHT: 68.9 IN | RESPIRATION RATE: 15 BRPM | BODY MASS INDEX: 28.88 KG/M2 | HEART RATE: 57 BPM | TEMPERATURE: 97.4 F | DIASTOLIC BLOOD PRESSURE: 81 MMHG | OXYGEN SATURATION: 98 % | SYSTOLIC BLOOD PRESSURE: 129 MMHG | WEIGHT: 195 LBS

## 2022-10-24 DIAGNOSIS — M86.10 OTHER ACUTE OSTEOMYELITIS, UNSPECIFIED SITE: ICD-10-CM

## 2022-10-24 PROCEDURE — 36415 COLL VENOUS BLD VENIPUNCTURE: CPT

## 2022-10-24 PROCEDURE — 99212 OFFICE O/P EST SF 10 MIN: CPT | Mod: 25

## 2022-10-24 PROCEDURE — 99072 ADDL SUPL MATRL&STAF TM PHE: CPT

## 2022-10-25 LAB
ALBUMIN SERPL ELPH-MCNC: 5 G/DL
ALP BLD-CCNC: 121 U/L
ALT SERPL-CCNC: 18 U/L
ANION GAP SERPL CALC-SCNC: 9 MMOL/L
AST SERPL-CCNC: 18 U/L
BILIRUB SERPL-MCNC: 0.4 MG/DL
BUN SERPL-MCNC: 11 MG/DL
CALCIUM SERPL-MCNC: 10.2 MG/DL
CHLORIDE SERPL-SCNC: 103 MMOL/L
CO2 SERPL-SCNC: 26 MMOL/L
CREAT SERPL-MCNC: 0.9 MG/DL
EGFR: 122 ML/MIN/1.73M2
GLUCOSE SERPL-MCNC: 104 MG/DL
POTASSIUM SERPL-SCNC: 5.7 MMOL/L
PROT SERPL-MCNC: 7.6 G/DL
SODIUM SERPL-SCNC: 138 MMOL/L

## 2022-10-26 LAB
BASOPHILS # BLD AUTO: 0.05 K/UL
BASOPHILS NFR BLD AUTO: 0.9 %
CRP SERPL-MCNC: <3 MG/L
EOSINOPHIL # BLD AUTO: 0.34 K/UL
EOSINOPHIL NFR BLD AUTO: 6.1 %
ERYTHROCYTE [SEDIMENTATION RATE] IN BLOOD BY WESTERGREN METHOD: 15 MM/HR
HCT VFR BLD CALC: 50.1 %
HGB BLD-MCNC: 16.6 G/DL
IMM GRANULOCYTES NFR BLD AUTO: 0.5 %
LYMPHOCYTES # BLD AUTO: 1.98 K/UL
LYMPHOCYTES NFR BLD AUTO: 35.6 %
MAN DIFF?: NORMAL
MCHC RBC-ENTMCNC: 30.3 PG
MCHC RBC-ENTMCNC: 33.1 GM/DL
MCV RBC AUTO: 91.4 FL
MONOCYTES # BLD AUTO: 0.45 K/UL
MONOCYTES NFR BLD AUTO: 8.1 %
NEUTROPHILS # BLD AUTO: 2.71 K/UL
NEUTROPHILS NFR BLD AUTO: 48.8 %
PLATELET # BLD AUTO: 338 K/UL
RBC # BLD: 5.48 M/UL
RBC # FLD: 13.8 %
WBC # FLD AUTO: 5.56 K/UL

## 2022-10-26 NOTE — HISTORY OF PRESENT ILLNESS
[FreeTextEntry1] : Admitted to [facility] 7/19/22-7/28/22\par \par \par \par 26 year old male with no known significant medical history was [place] [facility] to [facility] ED for a hand tenosynovitis and possible deeper tissue abscess collection in the left hand. As per the patient he put his hand in a glove and felt something sharp and had about a one cm cut ,the area got red and swollen and painful. Accompanied with fever and nausea no chills. s/p bedside I+D but developed worsening erythema up forearm.\par \par Admitted for:\par Left hand cellulitis/abscess/tenosynovitis s/p I+D left hand carpal tunnel release on 7/20/22\par Leukocytosis\par \par - BCX ngtd\par - wound CX MS staph aureus\par - s/p I+D left hand carpal tunnel release 7/20/22\par - Or cx MSSA\par - MR hand  1st distal/prox phalanx OM +septic arthritis of 1st pip, per ortho no further intervention\par - PICC placed\par - given mri findings planned for- cefazolin 2 g IV q8h through 9/1/22  for 6 weeks\par - pt reports getting tetanus vaccine 3-4 months ago prior to hospital admission\par \par \par Interval f/u 8/29/22-\par \par patient here for follow up visit with his mom who translates for him. Also used [employer]  # [id number]\par saw surgery for f/i , has f/u on 9/11\par \par labs from 8/24 reviewed\par ESr 20, prior was 16, prior was 17\par AST is 52\par \par diarrhea after each dose\par c/o itching under tape and there is irritation just under tape only with last dressing change\par \par Interval f/u 10/24/22- pt here with brother who translates. here for f/u visit. saw hand surgery about 1 month ago and was recommended physical therapy three times a week for 3 months. he was cleared to return to work. his pain is improving. denies fever. only has pain over wrist and thumb incisions. incisions are healing well. had diarrhea on abx but this has improved. reports some intermittent luq pain\par not on any meds\par

## 2022-10-26 NOTE — ASSESSMENT
[Treatment Education] : treatment education [Treatment Adherence] : treatment adherence [Rx Dose / Side Effects] : Rx dose/side effects [Risk Reduction] : risk reduction [Universal Precautions] : universal precautions [Anticipatory Guidance] : anticipatory guidance [FreeTextEntry1] : \par Patient is here for f/u for MSSA Left hand cellulitis/abscess/tenosynovitis and Mr findings concerning for septic arthritis, osteomyelitis, s/p I+D left hand carpal tunnel release on 7/20/22 completed cefazolin 2 g IV q8h through 9/1/22  for 6 weeks. \par \par he is doing well and has no complaints. hand incisions healing well and there is no sign of infection\par c/w PT as per ortho\par will check cbc cmp esr crp, BCX\par if any fever , worsening pain to hand can f/u ID as needed\par \par all questions and concerns addressed \par \par \par \par

## 2022-12-26 LAB — BACTERIA BLD CULT: NORMAL

## 2024-06-13 NOTE — ED ADULT NURSE NOTE - CAS EDP DISCH DISPOSITION ADMI
[FreeTextEntry1] : An audiogram was ordered and performed including OAEs, tympanometry, pure tones and speech, for patient's complaint of failed NBHS I have independently reviewed the patient's audiogram from today and my findings include normal hearing, A tymps CDU/CDP

## 2025-04-25 NOTE — DISCHARGE NOTE PROVIDER - CARE PROVIDER_API CALL
no Leroy Aldana (MD)  Plastic Surgery; Surgery of the Hand  [address], [address]  [place] [address]  Phone: [phone]  Fax: [phone]  Follow Up Time:

## (undated) DEVICE — SUT DERMABOND 0.7ML

## (undated) DEVICE — DRSG ACE BANDAGE 3"

## (undated) DEVICE — DRSG KERLIX ROLL 4.5"

## (undated) DEVICE — DRSG TEGADERM 4X4.75"

## (undated) DEVICE — FORCEP BIPOLAR NON STICK 4" W 12FT CORD DISP

## (undated) DEVICE — SUT MONOCRYL 5-0 18" P-3 UNDYED

## (undated) DEVICE — NDL HYPO REGULAR BEVEL 25G X 1.5"

## (undated) DEVICE — Device

## (undated) DEVICE — DRSG GAUZE 4X4"

## (undated) DEVICE — DRAPE INSTRUMENT POUCH

## (undated) DEVICE — WRAP COMPRESSION CALF MED

## (undated) DEVICE — BLADE CARPAL TUNNEL SNGL

## (undated) DEVICE — BLANKET WARMER LOWER ADULT

## (undated) DEVICE — DRSG 2X2

## (undated) DEVICE — CANISTER DISPOSABLE THIN WALL 3000CC

## (undated) DEVICE — LABEL MED BLANK W PEN

## (undated) DEVICE — DRSG COBAN 1"

## (undated) DEVICE — POSITIONER STRAP ARMBOARD VELCRO TS-30 12

## (undated) DEVICE — GLV 8 PROTEXIS

## (undated) DEVICE — CUFF TOURNIQUET 18" DUAL PORT LUER LOCK

## (undated) DEVICE — PACK HAND

## (undated) DEVICE — ELCTR GROUNDING PAD ADULT COVIDIEN

## (undated) DEVICE — GLV 7.5 PROTEXIS

## (undated) DEVICE — DRSG ESMARK 6"